# Patient Record
Sex: MALE | Race: WHITE | NOT HISPANIC OR LATINO | Employment: FULL TIME | URBAN - METROPOLITAN AREA
[De-identification: names, ages, dates, MRNs, and addresses within clinical notes are randomized per-mention and may not be internally consistent; named-entity substitution may affect disease eponyms.]

---

## 2021-03-18 ENCOUNTER — OFFICE VISIT (OUTPATIENT)
Dept: FAMILY MEDICINE CLINIC | Facility: CLINIC | Age: 26
End: 2021-03-18

## 2021-03-18 VITALS
TEMPERATURE: 98.4 F | BODY MASS INDEX: 22.96 KG/M2 | RESPIRATION RATE: 18 BRPM | HEART RATE: 82 BPM | OXYGEN SATURATION: 99 % | SYSTOLIC BLOOD PRESSURE: 135 MMHG | WEIGHT: 164 LBS | HEIGHT: 71 IN | DIASTOLIC BLOOD PRESSURE: 80 MMHG

## 2021-03-18 DIAGNOSIS — F41.9 ANXIETY AND DEPRESSION: ICD-10-CM

## 2021-03-18 DIAGNOSIS — S29.9XXA RIB INJURY: Primary | ICD-10-CM

## 2021-03-18 DIAGNOSIS — F32.A ANXIETY AND DEPRESSION: ICD-10-CM

## 2021-03-18 PROCEDURE — 99204 OFFICE O/P NEW MOD 45 MIN: CPT | Performed by: FAMILY MEDICINE

## 2021-03-18 RX ORDER — NAPROXEN 500 MG/1
500 TABLET ORAL 2 TIMES DAILY WITH MEALS
Qty: 60 TABLET | Refills: 0 | Status: SHIPPED | OUTPATIENT
Start: 2021-03-18 | End: 2022-05-04
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
## 2022-05-04 ENCOUNTER — OFFICE VISIT (OUTPATIENT)
Dept: FAMILY MEDICINE CLINIC | Facility: CLINIC | Age: 27
End: 2022-05-04

## 2022-05-04 ENCOUNTER — LAB (OUTPATIENT)
Dept: LAB | Facility: HOSPITAL | Age: 27
End: 2022-05-04

## 2022-05-04 VITALS
WEIGHT: 179 LBS | HEART RATE: 79 BPM | DIASTOLIC BLOOD PRESSURE: 72 MMHG | HEIGHT: 71 IN | BODY MASS INDEX: 25.06 KG/M2 | OXYGEN SATURATION: 98 % | SYSTOLIC BLOOD PRESSURE: 120 MMHG | TEMPERATURE: 96 F

## 2022-05-04 DIAGNOSIS — Z00.00 ANNUAL PHYSICAL EXAM: ICD-10-CM

## 2022-05-04 DIAGNOSIS — M25.562 ACUTE PAIN OF LEFT KNEE: ICD-10-CM

## 2022-05-04 DIAGNOSIS — Z00.00 ANNUAL PHYSICAL EXAM: Primary | ICD-10-CM

## 2022-05-04 DIAGNOSIS — I10 PRIMARY HYPERTENSION: ICD-10-CM

## 2022-05-04 DIAGNOSIS — L98.9 SKIN LESION: ICD-10-CM

## 2022-05-04 DIAGNOSIS — Z11.3 SCREEN FOR STD (SEXUALLY TRANSMITTED DISEASE): ICD-10-CM

## 2022-05-04 DIAGNOSIS — E61.1 IRON DEFICIENCY: ICD-10-CM

## 2022-05-04 LAB
ALBUMIN SERPL-MCNC: 4.7 G/DL (ref 3.5–5.2)
ALBUMIN/GLOB SERPL: 2.1 G/DL
ALP SERPL-CCNC: 87 U/L (ref 39–117)
ALT SERPL W P-5'-P-CCNC: 16 U/L (ref 1–41)
ANION GAP SERPL CALCULATED.3IONS-SCNC: 11.4 MMOL/L (ref 5–15)
AST SERPL-CCNC: 18 U/L (ref 1–40)
BASOPHILS # BLD AUTO: 0.04 10*3/MM3 (ref 0–0.2)
BASOPHILS NFR BLD AUTO: 0.7 % (ref 0–1.5)
BILIRUB SERPL-MCNC: 0.3 MG/DL (ref 0–1.2)
BUN SERPL-MCNC: 12 MG/DL (ref 6–20)
BUN/CREAT SERPL: 9.8 (ref 7–25)
CALCIUM SPEC-SCNC: 9.3 MG/DL (ref 8.6–10.5)
CHLORIDE SERPL-SCNC: 104 MMOL/L (ref 98–107)
CHOLEST SERPL-MCNC: 105 MG/DL (ref 0–200)
CO2 SERPL-SCNC: 23.6 MMOL/L (ref 22–29)
CREAT SERPL-MCNC: 1.23 MG/DL (ref 0.76–1.27)
DEPRECATED RDW RBC AUTO: 41.1 FL (ref 37–54)
EGFRCR SERPLBLD CKD-EPI 2021: 82 ML/MIN/1.73
EOSINOPHIL # BLD AUTO: 0.03 10*3/MM3 (ref 0–0.4)
EOSINOPHIL NFR BLD AUTO: 0.5 % (ref 0.3–6.2)
ERYTHROCYTE [DISTWIDTH] IN BLOOD BY AUTOMATED COUNT: 11.8 % (ref 12.3–15.4)
FERRITIN SERPL-MCNC: 78 NG/ML (ref 30–400)
FOLATE SERPL-MCNC: 11.3 NG/ML (ref 4.78–24.2)
GLOBULIN UR ELPH-MCNC: 2.2 GM/DL
GLUCOSE SERPL-MCNC: 92 MG/DL (ref 65–99)
HCT VFR BLD AUTO: 45.7 % (ref 37.5–51)
HCV AB SER DONR QL: NORMAL
HDLC SERPL-MCNC: 43 MG/DL (ref 40–60)
HGB BLD-MCNC: 15.7 G/DL (ref 13–17.7)
HIV1+2 AB SER QL: NORMAL
IMM GRANULOCYTES # BLD AUTO: 0.02 10*3/MM3 (ref 0–0.05)
IMM GRANULOCYTES NFR BLD AUTO: 0.4 % (ref 0–0.5)
IRON 24H UR-MRATE: 66 MCG/DL (ref 59–158)
IRON SATN MFR SERPL: 19 % (ref 20–50)
LDLC SERPL CALC-MCNC: 53 MG/DL (ref 0–100)
LDLC/HDLC SERPL: 1.3 {RATIO}
LYMPHOCYTES # BLD AUTO: 1.84 10*3/MM3 (ref 0.7–3.1)
LYMPHOCYTES NFR BLD AUTO: 33.2 % (ref 19.6–45.3)
MCH RBC QN AUTO: 32.4 PG (ref 26.6–33)
MCHC RBC AUTO-ENTMCNC: 34.4 G/DL (ref 31.5–35.7)
MCV RBC AUTO: 94.2 FL (ref 79–97)
MONOCYTES # BLD AUTO: 0.44 10*3/MM3 (ref 0.1–0.9)
MONOCYTES NFR BLD AUTO: 7.9 % (ref 5–12)
NEUTROPHILS NFR BLD AUTO: 3.18 10*3/MM3 (ref 1.7–7)
NEUTROPHILS NFR BLD AUTO: 57.3 % (ref 42.7–76)
NRBC BLD AUTO-RTO: 0 /100 WBC (ref 0–0.2)
PLATELET # BLD AUTO: 286 10*3/MM3 (ref 140–450)
PMV BLD AUTO: 10.1 FL (ref 6–12)
POTASSIUM SERPL-SCNC: 4.2 MMOL/L (ref 3.5–5.2)
PROT SERPL-MCNC: 6.9 G/DL (ref 6–8.5)
RBC # BLD AUTO: 4.85 10*6/MM3 (ref 4.14–5.8)
RPR SER QL: NORMAL
SODIUM SERPL-SCNC: 139 MMOL/L (ref 136–145)
TIBC SERPL-MCNC: 347 MCG/DL (ref 298–536)
TRANSFERRIN SERPL-MCNC: 233 MG/DL (ref 200–360)
TRIGL SERPL-MCNC: 30 MG/DL (ref 0–150)
TSH SERPL DL<=0.05 MIU/L-ACNC: 2.11 UIU/ML (ref 0.27–4.2)
VIT B12 BLD-MCNC: 485 PG/ML (ref 211–946)
VLDLC SERPL-MCNC: 9 MG/DL (ref 5–40)
WBC NRBC COR # BLD: 5.55 10*3/MM3 (ref 3.4–10.8)

## 2022-05-04 PROCEDURE — 85025 COMPLETE CBC W/AUTO DIFF WBC: CPT

## 2022-05-04 PROCEDURE — 86803 HEPATITIS C AB TEST: CPT

## 2022-05-04 PROCEDURE — 84466 ASSAY OF TRANSFERRIN: CPT

## 2022-05-04 PROCEDURE — 36415 COLL VENOUS BLD VENIPUNCTURE: CPT

## 2022-05-04 PROCEDURE — 17110 DESTRUCTION B9 LES UP TO 14: CPT | Performed by: FAMILY MEDICINE

## 2022-05-04 PROCEDURE — 83540 ASSAY OF IRON: CPT

## 2022-05-04 PROCEDURE — 99395 PREV VISIT EST AGE 18-39: CPT | Performed by: FAMILY MEDICINE

## 2022-05-04 PROCEDURE — 82607 VITAMIN B-12: CPT

## 2022-05-04 PROCEDURE — 80061 LIPID PANEL: CPT

## 2022-05-04 PROCEDURE — 82728 ASSAY OF FERRITIN: CPT

## 2022-05-04 PROCEDURE — 82746 ASSAY OF FOLIC ACID SERUM: CPT

## 2022-05-04 PROCEDURE — G0432 EIA HIV-1/HIV-2 SCREEN: HCPCS

## 2022-05-04 PROCEDURE — 99213 OFFICE O/P EST LOW 20 MIN: CPT | Performed by: FAMILY MEDICINE

## 2022-05-04 PROCEDURE — 84443 ASSAY THYROID STIM HORMONE: CPT

## 2022-05-04 PROCEDURE — 86592 SYPHILIS TEST NON-TREP QUAL: CPT

## 2022-05-04 PROCEDURE — 80053 COMPREHEN METABOLIC PANEL: CPT

## 2022-05-04 NOTE — PROGRESS NOTES
Follow Up Office Visit      Patient Name: Matthew Castillo  : 1995   MRN: [id number]     Chief Complaint:    Chief Complaint   Patient presents with   • Hypertension   • iron problem    • Annual Exam       History of Present Illness: Matthew Castillo is a 26 y.o. male who is here today to follow up with a few issues including elevated blood pressure at home, skin lesion on his penis and he reports low iron in his blood.  He gives plasma and has had this checked before.      Low iron - taking 65mg iron supplement. Gives plasma.     Skin lesion -reports a pimple-like lesion on his penis.  Denies any extra or sexual activity except for with his wife.  No other symptoms.    bp - checks 4-5x a week. It runs 144/88 at home. Blood pressure cuff is newer. He has hx of high bp when he took it on blood donation bus.     Annual -here for annual exam so we discussed dental care, diet, exercise vaccines.  He is up-to-date with all vaccines.    Of note patient has family history of aneurysm.      Review of systems positive for left knee pain.      Physical exam: Patient neurologic grossly intact.  Patient mood and affect is appropriate.  Patient no acute distress.  Patient's lung and heart exam was normal without rales rhonchi's or murmurs.  Patient's joints had no swelling.  No lower extremity edema.  On inferior aspect of shaft of penis is a submillimeter papular lesion with pustular component.  No other lesions noted.      Subjective        I have reviewed and the following portions of the patient's history were updated as appropriate: past family history, past medical history, past social history, past surgical history and problem list.    Medications:   No current outpatient medications on file.    Allergies:   No Known Allergies    Objective     Physical Exam: Please see above  Vital Signs:   Vitals:    22 0809   BP: 120/72   Pulse: 79   Temp: 96 °F (35.6 °C)   SpO2: 98%   Weight: 81.2 kg (179 lb)   Height:  "180.3 cm (71\")   PainSc: 0-No pain     Body mass index is 24.97 kg/m².          Assessment / Plan      Assessment/Plan:   Diagnoses and all orders for this visit:    1. Annual physical exam (Primary)  -     CBC & Differential; Future  -     Comprehensive Metabolic Panel; Future  -     Lipid Panel; Future  -     TSH Rfx On Abnormal To Free T4; Future  -     Hepatitis C Antibody; Future  -     HIV-1 / O / 2 Ag / Antibody 4th Generation; Future    2. Iron deficiency  -     Vitamin B12; Future  -     Folate; Future  -     Ferritin; Future  -     Iron and TIBC; Future    3. Skin lesion    4. Primary hypertension    5. Acute pain of left knee    6. Screen for STD (sexually transmitted disease)  -     RPR; Future  -     OneSwab - Kit, Urine, Clean Catch; Future     Annual counseling: Discussed dental care, diet, exercise.  Discussed vaccines and is up-to-date.  Reviewed STD testing.    Patient presented with concern for iron deficiency as reported on his previous lab work at the [facility].  Will confirm with testing today and work it up as needed.    Patient has skin lesion on his penis that looks like either a cyst or acne.      Procedure note- cryotherapy  Sprayed lesion using cryo therapy, and patient tolerated this procedure well.  Had no acute complications.  This was not for cosmetic use.  Treated 1 lesion.    Patient reports blood pressure issues at home.  Today it is well controlled.  Recommend resting for longer period of time and rechecking blood pressure.  Follow-up in 6 months if no better.    Acute pain of left knee.  Patient is currently doing physical therapy himself.  Will call if he needs a prescription for physical therapy in the future.    Follow Up:   Return if symptoms worsen or fail to improve.    Siddharth Pond,   [facility] [place] [facility] [place]   "

## 2022-05-20 ENCOUNTER — TELEPHONE (OUTPATIENT)
Dept: FAMILY MEDICINE CLINIC | Facility: CLINIC | Age: 27
End: 2022-05-20

## 2022-05-20 NOTE — TELEPHONE ENCOUNTER
Caller: Matthew Castillo    Relationship: Self    Best call back number: [phone]     What is the best time to reach you:ANYTIME    Who are you PCP OR MA    Do you know the name of the person who called:    What was the call regarding: PATIENT IS REQUESTING A CALLBACK TO DISCUSS HIS LABS SPECIFICALLY HIS CREATINE AND IRON LEVELS    Do you require a callback: YES

## 2022-05-23 DIAGNOSIS — R94.4 DECREASED GFR: Primary | ICD-10-CM

## 2022-07-25 ENCOUNTER — LAB (OUTPATIENT)
Dept: LAB | Facility: HOSPITAL | Age: 27
End: 2022-07-25

## 2022-07-25 DIAGNOSIS — R94.4 DECREASED GFR: ICD-10-CM

## 2022-07-25 LAB
ANION GAP SERPL CALCULATED.3IONS-SCNC: 12.8 MMOL/L (ref 5–15)
BUN SERPL-MCNC: 14 MG/DL (ref 6–20)
BUN/CREAT SERPL: 13.2 (ref 7–25)
CALCIUM SPEC-SCNC: 9.9 MG/DL (ref 8.6–10.5)
CHLORIDE SERPL-SCNC: 100 MMOL/L (ref 98–107)
CO2 SERPL-SCNC: 25.2 MMOL/L (ref 22–29)
CREAT SERPL-MCNC: 1.06 MG/DL (ref 0.76–1.27)
EGFRCR SERPLBLD CKD-EPI 2021: 98 ML/MIN/1.73
GLUCOSE SERPL-MCNC: 75 MG/DL (ref 65–99)
POTASSIUM SERPL-SCNC: 4.2 MMOL/L (ref 3.5–5.2)
SODIUM SERPL-SCNC: 138 MMOL/L (ref 136–145)

## 2022-07-25 PROCEDURE — 80048 BASIC METABOLIC PNL TOTAL CA: CPT

## 2023-09-15 ENCOUNTER — OFFICE VISIT (OUTPATIENT)
Dept: FAMILY MEDICINE CLINIC | Facility: CLINIC | Age: 28
End: 2023-09-15
Payer: COMMERCIAL

## 2023-09-15 ENCOUNTER — LAB (OUTPATIENT)
Dept: LAB | Facility: HOSPITAL | Age: 28
End: 2023-09-15
Payer: COMMERCIAL

## 2023-09-15 VITALS
BODY MASS INDEX: 22.68 KG/M2 | TEMPERATURE: 99 F | WEIGHT: 162 LBS | DIASTOLIC BLOOD PRESSURE: 70 MMHG | HEIGHT: 71 IN | OXYGEN SATURATION: 100 % | SYSTOLIC BLOOD PRESSURE: 116 MMHG | HEART RATE: 76 BPM

## 2023-09-15 DIAGNOSIS — Z00.00 ANNUAL PHYSICAL EXAM: ICD-10-CM

## 2023-09-15 DIAGNOSIS — Z00.00 ANNUAL PHYSICAL EXAM: Primary | ICD-10-CM

## 2023-09-15 LAB
ALBUMIN SERPL-MCNC: 5.1 G/DL (ref 3.5–5.2)
ALBUMIN/GLOB SERPL: 2.4 G/DL
ALP SERPL-CCNC: 71 U/L (ref 39–117)
ALT SERPL W P-5'-P-CCNC: 19 U/L (ref 1–41)
ANION GAP SERPL CALCULATED.3IONS-SCNC: 10 MMOL/L (ref 5–15)
AST SERPL-CCNC: 21 U/L (ref 1–40)
BASOPHILS # BLD AUTO: 0.04 10*3/MM3 (ref 0–0.2)
BASOPHILS NFR BLD AUTO: 0.7 % (ref 0–1.5)
BILIRUB SERPL-MCNC: 0.4 MG/DL (ref 0–1.2)
BUN SERPL-MCNC: 11 MG/DL (ref 6–20)
BUN/CREAT SERPL: 10.6 (ref 7–25)
CALCIUM SPEC-SCNC: 9.9 MG/DL (ref 8.6–10.5)
CHLORIDE SERPL-SCNC: 103 MMOL/L (ref 98–107)
CO2 SERPL-SCNC: 26 MMOL/L (ref 22–29)
CREAT SERPL-MCNC: 1.04 MG/DL (ref 0.76–1.27)
DEPRECATED RDW RBC AUTO: 37.4 FL (ref 37–54)
EGFRCR SERPLBLD CKD-EPI 2021: 99.1 ML/MIN/1.73
EOSINOPHIL # BLD AUTO: 0.03 10*3/MM3 (ref 0–0.4)
EOSINOPHIL NFR BLD AUTO: 0.5 % (ref 0.3–6.2)
ERYTHROCYTE [DISTWIDTH] IN BLOOD BY AUTOMATED COUNT: 11.8 % (ref 12.3–15.4)
GLOBULIN UR ELPH-MCNC: 2.1 GM/DL
GLUCOSE SERPL-MCNC: 89 MG/DL (ref 65–99)
HCT VFR BLD AUTO: 41.3 % (ref 37.5–51)
HGB BLD-MCNC: 14.7 G/DL (ref 13–17.7)
IMM GRANULOCYTES # BLD AUTO: 0.02 10*3/MM3 (ref 0–0.05)
IMM GRANULOCYTES NFR BLD AUTO: 0.3 % (ref 0–0.5)
LYMPHOCYTES # BLD AUTO: 2 10*3/MM3 (ref 0.7–3.1)
LYMPHOCYTES NFR BLD AUTO: 34.6 % (ref 19.6–45.3)
MCH RBC QN AUTO: 31.5 PG (ref 26.6–33)
MCHC RBC AUTO-ENTMCNC: 35.6 G/DL (ref 31.5–35.7)
MCV RBC AUTO: 88.6 FL (ref 79–97)
MONOCYTES # BLD AUTO: 0.49 10*3/MM3 (ref 0.1–0.9)
MONOCYTES NFR BLD AUTO: 8.5 % (ref 5–12)
NEUTROPHILS NFR BLD AUTO: 3.2 10*3/MM3 (ref 1.7–7)
NEUTROPHILS NFR BLD AUTO: 55.4 % (ref 42.7–76)
NRBC BLD AUTO-RTO: 0 /100 WBC (ref 0–0.2)
PLATELET # BLD AUTO: 262 10*3/MM3 (ref 140–450)
PMV BLD AUTO: 10.3 FL (ref 6–12)
POTASSIUM SERPL-SCNC: 4.2 MMOL/L (ref 3.5–5.2)
PROT SERPL-MCNC: 7.2 G/DL (ref 6–8.5)
RBC # BLD AUTO: 4.66 10*6/MM3 (ref 4.14–5.8)
SODIUM SERPL-SCNC: 139 MMOL/L (ref 136–145)
WBC NRBC COR # BLD: 5.78 10*3/MM3 (ref 3.4–10.8)

## 2023-09-15 PROCEDURE — 85025 COMPLETE CBC W/AUTO DIFF WBC: CPT

## 2023-09-15 PROCEDURE — 99395 PREV VISIT EST AGE 18-39: CPT | Performed by: FAMILY MEDICINE

## 2023-09-15 PROCEDURE — 80053 COMPREHEN METABOLIC PANEL: CPT

## 2023-09-15 NOTE — PROGRESS NOTES
"     Follow Up Office Visit      Patient Name: Matthew Castillo  : 1995   MRN: [id number]     Chief Complaint:    Chief Complaint   Patient presents with    Annual Exam     Creatinine levels?       History of Present Illness: Matthew Castillo is a 28 y.o. male who is here today to follow up with history of elevated creatinine levels in the past.  Otherwise his blood work has looked really good.  He takes iron supplementation.  He cycles a lot and does high-level exercise training.  He is cut out resistance training.  He feels like his diet is pretty good.  Patient up-to-date with dental and vision checks.  Up to date with vaccines      Physical exam: Patient's mood and affect is appropriate.  Neurological exam was grossly intact.  PERRLA.  Neck supple.  Heart exam RRR.  Lung exam CTA bilateral.  No lower extremity edema.      Subjective        I have reviewed and the following portions of the patient's history were updated as appropriate: past family history, past medical history, past social history, past surgical history and problem list.    Medications:   No current outpatient medications on file.    Allergies:   No Known Allergies    Objective     Physical Exam: Please see above  Vital Signs:   Vitals:    09/15/23 1504   BP: 116/70   Pulse: 76   Temp: 99 °F (37.2 °C)   SpO2: 100%   Weight: 73.5 kg (162 lb)   Height: 180.3 cm (71\")     Body mass index is 22.59 kg/m².          Assessment / Plan      Assessment/Plan:   Diagnoses and all orders for this visit:    1. Annual physical exam (Primary)  -     Comprehensive Metabolic Panel; Future  -     CBC & Differential; Future    We will continue to monitor CBC since patient is on ferritin.  Will check CMP as well.  Overall patient is doing well.  Follow-up yearly    Annual exam counseling: Counseled patient regards to diet and exercise.  Recommended 150 minutes of moderate exercise weekly.  Recommend incorporating resistance training into his routine.  Stiff day " with dental and vision checks.  Patient up-to-date with vaccines.    Follow Up:   Return in about 1 year (around 9/15/2024) for Annual.    Siddharth Pond DO  [facility] [place] [facility] [place]

## 2023-11-27 ENCOUNTER — OFFICE VISIT (OUTPATIENT)
Dept: FAMILY MEDICINE CLINIC | Facility: CLINIC | Age: 28
End: 2023-11-27
Payer: COMMERCIAL

## 2023-11-27 VITALS
HEIGHT: 71 IN | OXYGEN SATURATION: 99 % | SYSTOLIC BLOOD PRESSURE: 123 MMHG | HEART RATE: 86 BPM | BODY MASS INDEX: 23.3 KG/M2 | WEIGHT: 166.4 LBS | DIASTOLIC BLOOD PRESSURE: 72 MMHG | TEMPERATURE: 98 F

## 2023-11-27 DIAGNOSIS — K29.50 CHRONIC GASTRITIS WITHOUT BLEEDING, UNSPECIFIED GASTRITIS TYPE: Primary | ICD-10-CM

## 2023-11-27 DIAGNOSIS — K58.9 IRRITABLE BOWEL SYNDROME WITHOUT DIARRHEA: ICD-10-CM

## 2023-11-27 PROCEDURE — 99214 OFFICE O/P EST MOD 30 MIN: CPT | Performed by: NURSE PRACTITIONER

## 2023-11-27 RX ORDER — SUCRALFATE 1 G/1
1 TABLET ORAL 4 TIMES DAILY
Qty: 120 TABLET | Refills: 0 | Status: SHIPPED | OUTPATIENT
Start: 2023-11-27

## 2023-11-27 RX ORDER — FAMOTIDINE 20 MG/1
20 TABLET, FILM COATED ORAL 2 TIMES DAILY
COMMUNITY

## 2023-11-27 RX ORDER — DICYCLOMINE HYDROCHLORIDE 10 MG/1
10 CAPSULE ORAL
Qty: 120 CAPSULE | Refills: 1 | Status: SHIPPED | OUTPATIENT
Start: 2023-11-27

## 2023-11-27 RX ORDER — PANTOPRAZOLE SODIUM 40 MG/1
40 TABLET, DELAYED RELEASE ORAL 2 TIMES DAILY
Qty: 60 TABLET | Refills: 1 | Status: SHIPPED | OUTPATIENT
Start: 2023-11-27

## 2023-11-28 PROBLEM — K58.9 IRRITABLE BOWEL SYNDROME WITHOUT DIARRHEA: Status: ACTIVE | Noted: 2023-11-28

## 2023-11-28 PROBLEM — K29.50 CHRONIC GASTRITIS WITHOUT BLEEDING: Chronic | Status: ACTIVE | Noted: 2023-11-28

## 2023-11-28 PROBLEM — K29.50 CHRONIC GASTRITIS WITHOUT BLEEDING: Status: ACTIVE | Noted: 2023-11-28

## 2023-11-28 PROBLEM — K58.9 IRRITABLE BOWEL SYNDROME WITHOUT DIARRHEA: Chronic | Status: ACTIVE | Noted: 2023-11-28

## 2023-11-28 NOTE — PROGRESS NOTES
Follow Up Office Note     Patient Name: Matthew Castillo  : 1995   MRN: [id number]     Chief Complaint:    Chief Complaint   Patient presents with    Irritable Bowel Syndrome     Per patient when he eats he has a pain that goes from one side to the other, under rib area. Did't last long per patient. Patient also has esophagitis.        History of Present Illness: Matthew Castillo is a 28 y.o. male who presents today with c/o dysphagia, epigastric pain and abdominal pain which is worse after eating. Patient had EGD and Colonoscopy in 2017 and was diagnosed with esophagitis, gastritis and IBS. He states that his symptoms have flared over the past week. He states that he has been eating a bland diet, but he is still having pain. He reports that he has also tried OTC famotidine with only minimal benefit.      Subjective      I have reviewed and the following portions of the patient's history were updated as appropriate: past family history, past medical history, past social history, past surgical history and problem list.    Review of Systems:   Review of Systems   Constitutional:  Positive for appetite change. Negative for chills, diaphoresis, fever and unexpected weight change.   Respiratory: Negative.     Cardiovascular:  Negative for chest pain and palpitations.   Gastrointestinal:  Positive for abdominal pain. Negative for blood in stool, constipation, diarrhea, nausea and vomiting.   Genitourinary:  Negative for dysuria and flank pain.   Musculoskeletal:  Negative for myalgias.        Past Medical History:   Past Medical History:   Diagnosis Date    Esophageal ulcer 2017    Hypertension     Irritable bowel syndrome          Medications:     Current Outpatient Medications:     famotidine (PEPCID) 20 MG tablet, Take 1 tablet by mouth 2 (Two) Times a Day., Disp: , Rfl:     dicyclomine (BENTYL) 10 MG capsule, Take 1 capsule by mouth 4 (Four) Times a Day Before Meals & at Bedtime., Disp: 120 capsule, Rfl:  "1    pantoprazole (PROTONIX) 40 MG EC tablet, Take 1 tablet by mouth 2 (Two) Times a Day., Disp: 60 tablet, Rfl: 1    sucralfate (Carafate) 1 g tablet, Take 1 tablet by mouth 4 (Four) Times a Day., Disp: 120 tablet, Rfl: 0    Allergies:   No Known Allergies      Objective     Physical Exam:  Vital Signs:   Vitals:    11/27/23 1107   BP: 123/72   Pulse: 86   Temp: 98 °F (36.7 °C)   TempSrc: Infrared   SpO2: 99%   Weight: 75.5 kg (166 lb 6.4 oz)   Height: 180.3 cm (70.98\")   PainSc: 0-No pain     Body mass index is 23.22 kg/m².     Physical Exam  Vitals and nursing note reviewed.   Constitutional:       General: He is not in acute distress.     Appearance: Normal appearance. He is well-developed. He is not ill-appearing, toxic-appearing or diaphoretic.   HENT:      Head: Normocephalic and atraumatic.   Cardiovascular:      Rate and Rhythm: Normal rate and regular rhythm.   Pulmonary:      Effort: Pulmonary effort is normal. No respiratory distress.      Breath sounds: Normal breath sounds. No stridor. No wheezing.   Abdominal:      General: Bowel sounds are normal. There is no distension.      Palpations: Abdomen is soft.      Tenderness: There is abdominal tenderness (mild) in the right upper quadrant and epigastric area.   Skin:     General: Skin is warm and dry.   Neurological:      General: No focal deficit present.      Mental Status: He is alert and oriented to person, place, and time.   Psychiatric:         Mood and Affect: Mood normal.         Behavior: Behavior normal. Behavior is cooperative.         Thought Content: Thought content normal.         Judgment: Judgment normal.         Assessment / Plan      Assessment/Plan:   Diagnoses and all orders for this visit:    1. Chronic gastritis without bleeding, unspecified gastritis type (Primary)  Assessment & Plan:  Acute exacerbation of chronic problem.  Medication therapy per orders.  Follow-up in 2 to 4 weeks.  Should patient's symptoms persist recommend " considering gallbladder studies and/or gastro referral for repeat EGD and colonoscopy.  Commend bland diet and gallbladder eating plan.    Orders:  -     pantoprazole (PROTONIX) 40 MG EC tablet; Take 1 tablet by mouth 2 (Two) Times a Day.  Dispense: 60 tablet; Refill: 1  -     sucralfate (Carafate) 1 g tablet; Take 1 tablet by mouth 4 (Four) Times a Day.  Dispense: 120 tablet; Refill: 0    2. Irritable bowel syndrome without diarrhea  Assessment & Plan:  Acute exacerbation of chronic problem.  Plan to begin trial of Bentyl.    Orders:  -     dicyclomine (BENTYL) 10 MG capsule; Take 1 capsule by mouth 4 (Four) Times a Day Before Meals & at Bedtime.  Dispense: 120 capsule; Refill: 1           Follow Up:   PRN and at next scheduled appointment(s) with PCP.    Discussed the nature of the medical condition(s) risks, complications, implications, management, safe and proper use of medications. Encouraged medication compliance, and keeping scheduled follow up appointments with me and any other providers.      RTC if symptoms fail to improve, to ER if symptoms worsen.        *Dictated Utilizing Dragon Dictation   Please note that portions of this note were completed with a voice recognition program.   Part of this note may be an electronic transcription/translation of spoken language to printed text using the Dragon Dictation System. Spelling and/or grammatical errors may exist despite efforts at proofreading.      NOTE TO PATIENT: The 21st Century Cures Act makes medical notes like these available to patients in the interest of transparency. However, be advised this is a medical document. It is intended as peer to peer communication. It is written in medical language and may contain abbreviations or verbiage that are unfamiliar. It may appear blunt or direct. Medical documents are intended to carry relevant information, facts as evident, and the clinical opinion of the practitioner.      NAT Schaefer  [facility] [place] [place] [facility] [place]

## 2023-11-28 NOTE — ASSESSMENT & PLAN NOTE
Acute exacerbation of chronic problem.  Medication therapy per orders.  Follow-up in 2 to 4 weeks.  Should patient's symptoms persist recommend considering gallbladder studies and/or gastro referral for repeat EGD and colonoscopy.  Commend bland diet and gallbladder eating plan.

## 2023-12-06 ENCOUNTER — OFFICE VISIT (OUTPATIENT)
Dept: FAMILY MEDICINE CLINIC | Facility: CLINIC | Age: 28
End: 2023-12-06
Payer: COMMERCIAL

## 2023-12-06 VITALS
DIASTOLIC BLOOD PRESSURE: 68 MMHG | OXYGEN SATURATION: 99 % | TEMPERATURE: 98.6 F | SYSTOLIC BLOOD PRESSURE: 124 MMHG | WEIGHT: 167 LBS | HEIGHT: 71 IN | HEART RATE: 88 BPM | BODY MASS INDEX: 23.38 KG/M2

## 2023-12-06 DIAGNOSIS — F41.9 ANXIETY: ICD-10-CM

## 2023-12-06 DIAGNOSIS — R10.13 EPIGASTRIC PAIN: Primary | ICD-10-CM

## 2023-12-06 PROCEDURE — 99214 OFFICE O/P EST MOD 30 MIN: CPT | Performed by: FAMILY MEDICINE

## 2023-12-06 RX ORDER — AMITRIPTYLINE HYDROCHLORIDE 10 MG/1
10 TABLET, FILM COATED ORAL NIGHTLY
Qty: 30 TABLET | Refills: 2 | Status: SHIPPED | OUTPATIENT
Start: 2023-12-06

## 2023-12-06 NOTE — PROGRESS NOTES
Follow Up Office Visit      Patient Name: Matthew Castillo  : 1995   MRN: [id number]     Chief Complaint:    Chief Complaint   Patient presents with    Anxiety    Depression    Irritable Bowel Syndrome     Has lasted for a month, noticed flare up 2023.       History of Present Illness: Matthew Castillo is a 28 y.o. male who is here today to follow up with upper abdominal pain.       Stomach pain - started a month ago. Hx of ibs. Has stomach pain and gerd. Wasn't on an antiacids when it started. Since it started he has been on pepcid, prilosec, and now pantoprazole. He has had egd. He has been diagnosed with esophagitis and ibs. In 2017 he had an ulcer. Now after he eats the symptoms persist for up to two hours. He has a pain diary. When he wakes up pain is better, no pain. He gets lower chest pain. Pain can be under left shoulder and rib cage. He isn't constipated and says he goes every day or every other day. No diarrhea. Pain can radiate to back recently. Pain has improved. Denies alcohol use. Has burning sensation b/l. No black stool. His sx improved 90%.     Anxiety - increasing due to holidays and work.       Physical exam: mild tenderness RUQ. Non distended abdomen.       Subjective        I have reviewed and the following portions of the patient's history were updated as appropriate: past family history, past medical history, past social history, past surgical history and problem list.    Medications:     Current Outpatient Medications:     dicyclomine (BENTYL) 10 MG capsule, Take 1 capsule by mouth 4 (Four) Times a Day Before Meals & at Bedtime., Disp: 120 capsule, Rfl: 1    pantoprazole (PROTONIX) 40 MG EC tablet, Take 1 tablet by mouth 2 (Two) Times a Day., Disp: 60 tablet, Rfl: 1    sucralfate (Carafate) 1 g tablet, Take 1 tablet by mouth 4 (Four) Times a Day., Disp: 120 tablet, Rfl: 0    amitriptyline (ELAVIL) 10 MG tablet, Take 1 tablet by mouth Every Night., Disp: 30 tablet, Rfl:  "2    Allergies:   No Known Allergies    Objective     Physical Exam: Please see above  Vital Signs:   Vitals:    12/06/23 1006   BP: 124/68   Pulse: 88   Temp: 98.6 °F (37 °C)   SpO2: 99%   Weight: 75.8 kg (167 lb)   Height: 180.3 cm (70.98\")   PainSc:   1     Body mass index is 23.3 kg/m².          Assessment / Plan      Assessment/Plan:   Diagnoses and all orders for this visit:    1. Epigastric pain (Primary)    2. Anxiety  -     amitriptyline (ELAVIL) 10 MG tablet; Take 1 tablet by mouth Every Night.  Dispense: 30 tablet; Refill: 2           Abdominal pain -gallbladder ulcer related.  Will hold off on US. Can call for US if pain persist or worsens.     Anxiety and IBS - increased stress and IBS sx. are Elavil.  Can stop if any side effects occur.  Consider Effexor next visit if not doing well on current medication.  If no side effects with Elavil but no improvement-can increase slowly.    If patient calls for ultrasound, ordered for epigastric pain in relation to possible gallbladder disorder.  May need HIDA scan in the future.  We will wean patient off of pantoprazole when he calls in for refill.  Patient will reduce dose to once a day 40 mg dosing of pantoprazole and then after 1 month of that he will go down to 20 mg and then stop.  Hopefully will wean off of all medication over the next few months.  Will refer to GI if endoscopy is needed.  Etiology likely ulcer or gallbladder related.    Follow Up:   Return in about 2 months (around 2/6/2024) for Recheck.    Siddharth Pond DO  [facility] [place] [facility] [place]   "

## 2023-12-13 DIAGNOSIS — R10.13 EPIGASTRIC PAIN: Primary | ICD-10-CM

## 2023-12-15 ENCOUNTER — LAB (OUTPATIENT)
Dept: LAB | Facility: HOSPITAL | Age: 28
End: 2023-12-15
Payer: COMMERCIAL

## 2023-12-15 DIAGNOSIS — R10.13 EPIGASTRIC PAIN: ICD-10-CM

## 2023-12-15 PROCEDURE — 87338 HPYLORI STOOL AG IA: CPT

## 2023-12-16 LAB — H PYLORI AG STL QL IA: NEGATIVE

## 2023-12-19 ENCOUNTER — TELEPHONE (OUTPATIENT)
Dept: FAMILY MEDICINE CLINIC | Facility: CLINIC | Age: 28
End: 2023-12-19

## 2023-12-19 NOTE — TELEPHONE ENCOUNTER
Caller: Matthew Castillo    Relationship: Self    Best call back number: [phone]    What is the medical concern/diagnosis: GI ISSUES    Any additional details: PATIENT HAS BEEN IN A FEW TIMES WITHIN THE LAST 2 MONTHS WITH GI ISSUES AND WOULD LIKE TO SEE A GI SPECIALIST. PLEASE CALL PATIENT WHEN THIS IS DONE

## 2023-12-21 DIAGNOSIS — K29.50 CHRONIC GASTRITIS WITHOUT BLEEDING, UNSPECIFIED GASTRITIS TYPE: ICD-10-CM

## 2023-12-21 RX ORDER — SUCRALFATE 1 G/1
1 TABLET ORAL 4 TIMES DAILY
Qty: 120 TABLET | Refills: 0 | Status: SHIPPED | OUTPATIENT
Start: 2023-12-21

## 2024-01-02 ENCOUNTER — OFFICE VISIT (OUTPATIENT)
Dept: FAMILY MEDICINE CLINIC | Facility: CLINIC | Age: 29
End: 2024-01-02
Payer: COMMERCIAL

## 2024-01-02 VITALS
TEMPERATURE: 99.5 F | OXYGEN SATURATION: 99 % | HEART RATE: 96 BPM | WEIGHT: 172 LBS | DIASTOLIC BLOOD PRESSURE: 64 MMHG | SYSTOLIC BLOOD PRESSURE: 104 MMHG | BODY MASS INDEX: 24.08 KG/M2 | HEIGHT: 71 IN

## 2024-01-02 DIAGNOSIS — K29.50 CHRONIC GASTRITIS WITHOUT BLEEDING, UNSPECIFIED GASTRITIS TYPE: Primary | ICD-10-CM

## 2024-01-02 DIAGNOSIS — K58.9 IRRITABLE BOWEL SYNDROME WITHOUT DIARRHEA: ICD-10-CM

## 2024-01-02 PROCEDURE — 99213 OFFICE O/P EST LOW 20 MIN: CPT | Performed by: FAMILY MEDICINE

## 2024-01-02 NOTE — PROGRESS NOTES
Follow Up Office Visit      Patient Name: Matthew Castillo  : 1995   MRN: [id number]     Chief Complaint:    Chief Complaint   Patient presents with    Irritable Bowel Syndrome       History of Present Illness: Matthew Castillo is a 28 y.o. male who is here today to follow up with sharp pain in his left upper quadrant. He has tried prilosec, protonix, pepcid, and gas ex. He has been on elavil and colace. He has been on carafate. Food can make the pain worse and it can help it at times. He has been dx with IBS and ulcer.  Patient's symptoms include periodic regurgitation, pain, and bloating.  Patient reports that there is no exacerbating or alleviating factors as far as food.  He has symptoms with certain types of foods and it sometimes is food do not bother him as much.  Patient specifically reports that he ate oatmeal today and does not feel bad at all.  Patient has been on Elavil which has helped some but did cause some side effects as far as constipation and trouble sleeping.  He is currently on Colace which helps.  He is on a high dose of Protonix twice a day.  Patient has stopped Bentyl and Carafate.  Patient has been taking aloe vera, probiotics, and tricia root.    Patient has had EGD in the past with an ulcer.  Has Villanueva's esophagus in his family.      Physical exam: Patient does not have any tenderness palpation of all 4 quadrants of abdomen.  Mood and affect appropriate.      Subjective        I have reviewed and the following portions of the patient's history were updated as appropriate: past family history, past medical history, past social history, past surgical history and problem list.    Medications:     Current Outpatient Medications:     amitriptyline (ELAVIL) 10 MG tablet, Take 1 tablet by mouth Every Night., Disp: 30 tablet, Rfl: 2    docusate sodium (COLACE) 50 MG capsule, Take 1 capsule by mouth Daily., Disp: , Rfl:     pantoprazole (PROTONIX) 40 MG EC tablet, Take 1 tablet by  "mouth 2 (Two) Times a Day., Disp: 60 tablet, Rfl: 1    Allergies:   No Known Allergies    Objective     Physical Exam: Please see above  Vital Signs:   Vitals:    01/02/24 1209   BP: 104/64   Pulse: 96   Temp: 99.5 °F (37.5 °C)   SpO2: 99%   Weight: 78 kg (172 lb)   Height: 180.3 cm (70.98\")     Body mass index is 24 kg/m².          Assessment / Plan      Assessment/Plan:   Diagnoses and all orders for this visit:    1. Chronic gastritis without bleeding, unspecified gastritis type (Primary)  -     Ambulatory Referral to Gastroenterology    2. Irritable bowel syndrome without diarrhea  -     Ambulatory Referral to Gastroenterology    Recommend a trial of digestive enzymes.  Patient can continue other herbs and supplements that he is already taking.  Continue Protonix and amitriptyline.  Decided not to increase amitriptyline due to side effect patient has already had.  Continue current dose.  Continue Colace.    Refer patient to GI for further evaluation of epigastric pain and IBS type of symptoms.  Patient advised to take the EGD results from 2017 to visit.  Call with any worsening symptoms including weight loss, black or red stool, or worsening pain.    Follow Up:   Return if symptoms worsen or fail to improve.    Siddharth Pond, DO  [facility]   "

## 2024-01-23 ENCOUNTER — OFFICE VISIT (OUTPATIENT)
Dept: GASTROENTEROLOGY | Facility: CLINIC | Age: 29
End: 2024-01-23
Payer: COMMERCIAL

## 2024-01-23 VITALS
BODY MASS INDEX: 24.05 KG/M2 | SYSTOLIC BLOOD PRESSURE: 135 MMHG | TEMPERATURE: 98.9 F | HEIGHT: 71 IN | WEIGHT: 171.8 LBS | HEART RATE: 109 BPM | DIASTOLIC BLOOD PRESSURE: 71 MMHG

## 2024-01-23 DIAGNOSIS — K29.50 CHRONIC GASTRITIS WITHOUT BLEEDING, UNSPECIFIED GASTRITIS TYPE: ICD-10-CM

## 2024-01-23 DIAGNOSIS — R10.13 EPIGASTRIC PAIN: Primary | ICD-10-CM

## 2024-01-23 DIAGNOSIS — R10.12 LEFT UPPER QUADRANT ABDOMINAL PAIN: ICD-10-CM

## 2024-01-23 PROCEDURE — 99214 OFFICE O/P EST MOD 30 MIN: CPT | Performed by: NURSE PRACTITIONER

## 2024-01-23 RX ORDER — PANTOPRAZOLE SODIUM 40 MG/1
40 TABLET, DELAYED RELEASE ORAL 2 TIMES DAILY
Qty: 180 TABLET | Refills: 3 | Status: SHIPPED | OUTPATIENT
Start: 2024-01-23

## 2024-01-23 RX ORDER — PANTOPRAZOLE SODIUM 40 MG/1
40 TABLET, DELAYED RELEASE ORAL 2 TIMES DAILY
Qty: 180 TABLET | Refills: 3 | Status: SHIPPED | OUTPATIENT
Start: 2024-01-23 | End: 2024-01-23 | Stop reason: SDUPTHER

## 2024-01-23 NOTE — PROGRESS NOTES
GASTROENTEROLOGY OFFICE NOTE  Matthew Castillo  [id number]  1995    CARE TEAM  Patient Care Team:  Siddharth Pond DO as PCP - General (Family Medicine)    Referring Provider: Siddharth Pond DO     Chief Complaint   Patient presents with    Abdominal Pain        HISTORY OF PRESENT ILLNESS:  Patient is a 28-year-old male seen today with complaints of left upper quadrant abdominal pain and epigastric pain.  He has a history of esophageal ulcer (2017) and been diagnosed with IBS.      He has been having troubles for the past few months of a burning in his epigastric region.  It sounds like he was experiencing a globus sensation as well but denies dysphagia.  Given his previous history he was started on pantoprazole 40 mg twice daily by his PCP and theses symptoms have nearly, if not entirely resolved.      He continues to have some left upper quadrant abdominal pain.  This pain occurs about 2 hours after he eats and he experiences a very sharp pain in the pinpointed area in the left intercostal margin.  The pain lasts seconds at a time before resolving without intervention that can recur multiple times for several hours.  Once the sharp pain entirely resolves he is left with some soreness or aching sensation for several hours.  He was recently started on amitriptyline 10 mg at bedtime for this pain but does not know as of yet if it has been effective.  He reports that he was previously treated with nortriptyline for IBS related abdominal pain.    PAST MEDICAL HISTORY  Past Medical History:   Diagnosis Date    Esophageal ulcer 2017    Hypertension     Irritable bowel syndrome         PAST SURGICAL HISTORY  Past Surgical History:   Procedure Laterality Date    COLONOSCOPY      UPPER GASTROINTESTINAL ENDOSCOPY          MEDICATIONS:    Current Outpatient Medications:     amitriptyline (ELAVIL) 10 MG tablet, Take 1 tablet by mouth Every Night., Disp: 30 tablet, Rfl: 2    pantoprazole (PROTONIX) 40 MG EC tablet, Take 1  Well Child Visit at 4 Months   WHAT YOU NEED TO KNOW:   What is a well child visit? A well child visit is when your child sees a healthcare provider to prevent health problems  Well child visits are used to track your child's growth and development  It is also a time for you to ask questions and to get information on how to keep your child safe  Write down your questions so you remember to ask them  Your child should have regular well child visits from birth to 16 years  What development milestones may my baby reach at 4 months? Each baby develops at his or her own pace  Your baby might have already reached the following milestones, or he or she may reach them later:  Smile and laugh     in response to someone cooing at him or her    Bring his or her hands together in front of him or her    Reach for objects and grasp them, and then let them go    Bring toys to his or her mouth    Control his or her head when he or she is placed in a seated position    Hold his or her head and chest up and support himself or herself on his or her arms when he or she is placed on his or her tummy    Roll from front to back    What can I do when my baby cries? Your baby may cry because he or she is hungry  He or she may have a wet diaper, or feel hot or cold  He or she may cry for no reason you can find  Your baby may cry more often in the evening or late afternoon  It can be hard to listen to your baby cry and not be able to calm him or her down  Ask for help and take a break if you feel stressed or overwhelmed  Never shake your baby to try to stop his or her crying  This can cause blindness or brain damage  The following may help comfort your baby:  Hold your baby skin to skin and rock him or her, or swaddle him or her in a soft blanket  Gently pat your baby's back or chest  Stroke or rub his or her head  Quietly sing or talk to your baby, or play soft, soothing music      Put your baby in his or her car seat and "tablet by mouth 2 (Two) Times a Day., Disp: 180 tablet, Rfl: 3    docusate sodium (COLACE) 50 MG capsule, Take 1 capsule by mouth Daily. (Patient not taking: Reported on 1/23/2024), Disp: , Rfl:     ALLERGIES  No Known Allergies    FAMILY HISTORY:  Family History   Problem Relation Age of Onset    Diabetes Mother     Colon cancer Mother     No Known Problems Father     Dementia Maternal Grandmother     Lung cancer Maternal Grandfather     Lupus Paternal Grandmother     Irritable bowel syndrome Paternal Grandfather     Crohn's disease Paternal Grandfather     Heart disease Paternal Grandfather        SOCIAL HISTORY  Social History     Socioeconomic History    Marital status:    Tobacco Use    Smoking status: Never    Smokeless tobacco: Former     Types: Chew     Quit date: 3/1/2020   Vaping Use    Vaping Use: Some days    Start date: 3/9/2020    Substances: Nicotine    Devices: Disposable   Substance and Sexual Activity    Alcohol use: Not Currently     Comment: quit 11 weeks ago    Drug use: Never    Sexual activity: Yes     Partners: Female         PHYSICAL EXAM   /71 (BP Location: Left arm, Patient Position: Sitting, Cuff Size: Adult)   Pulse 109   Temp 98.9 °F (37.2 °C) (Temporal)   Ht 180.3 cm (70.98\")   Wt 77.9 kg (171 lb 12.8 oz)   BMI 23.97 kg/m²   Physical Exam  Constitutional:       Appearance: Normal appearance.   HENT:      Head: Normocephalic and atraumatic.   Cardiovascular:      Rate and Rhythm: Normal rate and regular rhythm.   Pulmonary:      Effort: Pulmonary effort is normal.   Abdominal:      General: Bowel sounds are normal. There is no distension.      Palpations: Abdomen is soft.      Tenderness: There is no abdominal tenderness.   Neurological:      Mental Status: He is alert and oriented to person, place, and time.   Psychiatric:         Mood and Affect: Mood normal.         Thought Content: Thought content normal.           ASSESSMENT / PLAN  1.  GERD  - Pantoprazole 40 mg " take him or her for a drive, or go for a stroller ride  Burp your baby to get rid of extra gas  Give your baby a soothing, warm bath  What can I do to keep my baby safe in the car? Always place your baby in a rear-facing car seat  Choose a seat that meets the Federal Motor Vehicle Safety Standard 213  Make sure the child safety seat has a harness and clip  Also make sure that the harness and clips fit snugly against your baby  There should be no more than a finger width of space between the strap and your baby's chest  Ask your healthcare provider for more information on car safety seats  Always put your baby's car seat in the back seat  Never put your baby's car seat in the front  This will help prevent him or her from being injured in an accident  What can I do to keep my baby safe at home? Do not give your baby medicine unless directed by his or her healthcare provider  Ask for directions if you do not know how to give the medicine  If your baby misses a dose, do not double the next dose  Ask how to make up the missed dose  Do not give aspirin to children under 25years of age  Your child could develop Reye syndrome if he takes aspirin  Reye syndrome can cause life-threatening brain and liver damage  Check your child's medicine labels for aspirin, salicylates, or oil of wintergreen  Do not leave your baby on a changing table, couch, bed, or infant seat alone  Your baby could roll or push himself or herself off  Keep one hand on your baby as you change his or her diaper or clothes  Never leave your baby alone in the bathtub or sink  A baby can drown in less than 1 inch of water  Always test the water temperature before you give your baby a bath  Test the water on your wrist before putting your baby in the bath to make sure it is not too hot  If you have a bath thermometer, the water temperature should be 90°F to 100°F (32 3°C to 37 8°C)   Keep your faucet water temperature daily  2.  Left upper quadrant abdominal pain  Likely visceral hypersensitivity  - EGD      Return for Follow up after procedures.    I discussed the patients findings and my recommendations with patient    NAT [place]                     lower than 120°F     Never leave your baby in a playpen or crib with the drop-side down  Your baby could fall and be injured  Make sure the drop-side is locked in place  Do not let your baby use a walker  Walkers are not safe for your baby  Walkers do not help your baby learn to walk  Your baby can roll down the stairs  Walkers also allow your baby to reach higher  Your baby might reach for hot drinks, grab pot handles off the stove, or reach for medicines or other unsafe items  How should I lay my baby down to sleep? It is very important to lay your baby down to sleep in safe surroundings  This can greatly reduce his or her risk for SIDS  Tell grandparents, babysitters, and anyone else who cares for your baby the following rules:  Put your baby on his or her back to sleep  Do this every time he or she sleeps (naps and at night)  Do this even if your baby sleeps more soundly on his or her stomach or side  Your baby is less likely to choke on spit-up or vomit if he or she sleeps on his or her back  Put your baby on a firm, flat surface to sleep  Your baby should sleep in a crib, bassinet, or cradle that meets the safety standards of the Consumer Product Safety Commission ([address] [place])  Do not let him or her sleep on pillows, waterbeds, soft mattresses, quilts, beanbags, or other soft surfaces  Move your baby to his or her bed if he or she falls asleep in a car seat, stroller, or swing  He or she may change positions in a sitting device and not be able to breathe well  Put your baby to sleep in a crib or bassinet that has firm sides  The rails around your baby's crib should not be more than 2? inches apart  A mesh crib should have small openings less than ¼ inch  Put your baby in his or her own bed  A crib or bassinet in your room, near your bed, is the safest place for your baby to sleep  Never let him or her sleep in bed with you  Never let him or her sleep on a couch or recliner      Do not leave soft objects or loose bedding in his or her crib  His or her bed should contain only a mattress covered with a fitted bottom sheet  Use a sheet that is made for the mattress  Do not put pillows, bumpers, comforters, or stuffed animals in the bed  Dress your baby in a sleep sack or other sleep clothing before you put him or her down to sleep  Do not use loose blankets  If you must use a blanket, tuck it around the mattress  Do not let your baby get too hot  Keep the room at a temperature that is comfortable for an adult  Never dress your baby in more than 1 layer more than you would wear  Do not cover your baby's face or head while he or she sleeps  Your baby is too hot if he or she is sweating or his or her chest feels hot  Do not raise the head of your baby's bed  Your baby could slide or roll into a position that makes it hard for him or her to breathe  What do I need to know about feeding my baby? Breast milk or iron-fortified formula is the only food your baby needs for the first 4 to 6 months of life  Breast milk gives your baby the best nutrition  It also has antibodies and other substances that help protect your baby's immune system  Babies should breastfeed for about 10 to 20 minutes or longer on each breast  Your baby will need 8 to 12 feedings every 24 hours  If he or she sleeps for more than 4 hours at one time, wake him or her up to eat  Iron-fortified formula also provides all the nutrients your baby needs  Formula is available in a concentrated liquid or powder form  You need to add water to these formulas  Follow the directions when you mix the formula so your baby gets the right amount of nutrients  There is also a ready-to-feed formula that does not need to be mixed with water  Ask your healthcare provider which formula is right for your baby  As your baby gets older, he or she will drink 26 to 36 ounces each day   When he or she starts to sleep for longer periods, he or she will still need to feed 6 to 8 times in 24 hours  Do not overfeed your baby  Overfeeding means your baby gets too many calories during a feeding  This may cause him or her to gain weight too fast  Do not try to continue to feed your baby when he or she is no longer hungry  Do not add baby cereal to the bottle  Overfeeding can happen if you add baby cereal to formula or breast milk  You can make more if your baby is still hungry after he or she finishes a bottle  Do not use a microwave to heat your baby's bottle  The milk or formula will not heat evenly and will have spots that are very hot  Your baby's face or mouth could be burned  You can warm the milk or formula quickly by placing the bottle in a pot of warm water for a few minutes  Burp your baby during the middle of his or her feeding or after he or she is done  Hold your baby against your shoulder  Put one of your hands under your baby's bottom  Gently rub or pat his or her back with your other hand  You can also sit your baby on your lap with his or her head leaning forward  Support his or her chest and head with your hand  Gently rub or pat his or her back with your other hand  Your baby's neck may not be strong enough to hold his or her head up  Until your baby's neck gets stronger, you must always support his or her head  If your baby's head falls backward, he or she may get a neck injury  Do not prop a bottle in your baby's mouth or let him or her lie flat during a feeding  Your baby can choke in that position  If your child lies down during a feeding, the milk may also flow into his or her middle ear and cause an infection  What do I need to know about peanut allergies? Peanut allergies may be prevented by giving young babies peanut products  If your baby has severe eczema or an egg allergy, he or she is at risk for a peanut allergy  Your baby needs to be tested before he or she has a peanut product   Talk to your baby's healthcare provider  If your baby tests positive, the first peanut product must be given in the provider's office  The first taste may be when your baby is 3to 10months of age  A peanut allergy test is not needed if your baby has mild to moderate eczema  Peanut products can be given around 10months of age  Talk to your baby's provider before you give the first taste  If your baby does not have eczema, talk to his or her provider  He or she may say it is okay to give peanut products at 3to 10months of age  Do not  give your baby chunky peanut butter or whole peanuts  He or she could choke  Give your baby smooth peanut butter or foods made with peanut butter  How can I help my baby get physical activity? Your baby needs physical activity so his or her muscles can develop  Encourage your baby to be active through play  The following are some ways that you can encourage your baby to be active:  Ova Lightning a mobile over your baby's crib  to motivate him or her to reach for it  Gently turn, roll, bounce, and sway your baby  to help increase muscle strength  Place your baby on your lap, facing you  Hold your baby's hands and help him or her stand  Be sure to support his or her head if he or she cannot hold it steady  Play with your baby on the floor  Place your baby on his or her tummy  Tummy time helps your baby learn to hold his or her head up  Put a toy just out of his or her reach  This may motivate him or her to roll over as he or she tries to reach it  What are other ways I can care for my baby? Help your baby develop a healthy sleep-wake cycle  Your baby needs sleep to help him or her stay healthy and grow  Create a routine for bedtime  Bathe and feed your baby right before you put him or her to bed  This will help him or her relax and get to sleep easier  Put your baby in his or her crib when he or she is awake but sleepy  Relieve your baby's teething discomfort with a cold teething ring    Ask your healthcare provider about other ways that you can relieve your baby's teething discomfort  Your baby's first tooth may appear between 3and 6months of age  Some symptoms of teething include drooling, irritability, fussiness, ear rubbing, and sore, tender gums  Read to your baby  This will comfort your baby and help his or her brain develop  Point to pictures as you read  This will help your baby make connections between pictures and words  Have other family members or caregivers read to your baby  Do not smoke near your baby  Do not let anyone else smoke near your baby  Do not smoke in your home or vehicle  Smoke from cigarettes or cigars can cause asthma or breathing problems in your baby  Take an infant CPR and first aid class  These classes will help teach you how to care for your baby in an emergency  Ask your baby's healthcare provider where you can take these classes  How can I care for myself during this time? Go to all postpartum check-up visits  Your healthcare providers will check your health  Tell them if you have any questions or concerns about your health  They can also help you create or update meal plans  This can help you make sure you are getting enough calories and nutrients, especially if you are breastfeeding  Talk to your providers about an exercise plan  Exercise, such as walking, can help increase your energy levels, improve your mood, and manage your weight  Your providers will tell you how much activity to get each day, and which activities are best for you  Find time for yourself  Ask a friend, family member, or your partner to watch the baby  Do activities that you enjoy and help you relax  Consider joining a support group with other women who recently had babies if you have not joined one already  It may be helpful to share information about caring for your babies  You can also talk about how you are feeling emotionally and physically      Talk to your baby's pediatrician about postpartum depression  You may have had screening for postpartum depression during your baby's last well child visit  Screening may also be part of this visit  Screening means your baby's pediatrician will ask if you feel sad, depressed, or very tired  These feelings can be signs of postpartum depression  Tell him or her about any new or worsening problems you or your baby had since your last visit  Also describe anything that makes you feel worse or better  The pediatrician can help you get treatment, such as talk therapy, medicines, or both  What do I need to know about my baby's next well child visit? Your baby's healthcare provider will tell you when to bring your baby in again  The next well child visit is usually at 6 months  Contact your child's healthcare provider if you have questions or concerns about your baby's health or care before the next visit  Your baby may need vaccines at the next well child visit  Your provider will tell you which vaccines your baby needs and when your baby should get them  CARE AGREEMENT:   You have the right to help plan your baby's care  Learn about your baby's health condition and how it may be treated  Discuss treatment options with your baby's healthcare providers to decide what care you want for your baby  The above information is an  only  It is not intended as medical advice for individual conditions or treatments  Talk to your doctor, nurse or pharmacist before following any medical regimen to see if it is safe and effective for you  © Copyright [employer] 2022 Information is for End User's use only and may not be sold, redistributed or otherwise used for commercial purposes   All illustrations and images included in CareNotes® are the copyrighted property of [employer]  or [address] [place], [place] [address]

## 2024-01-31 ENCOUNTER — OFFICE VISIT (OUTPATIENT)
Dept: FAMILY MEDICINE CLINIC | Facility: CLINIC | Age: 29
End: 2024-01-31
Payer: COMMERCIAL

## 2024-01-31 VITALS
HEART RATE: 99 BPM | DIASTOLIC BLOOD PRESSURE: 78 MMHG | HEIGHT: 71 IN | WEIGHT: 168 LBS | BODY MASS INDEX: 23.52 KG/M2 | SYSTOLIC BLOOD PRESSURE: 134 MMHG | TEMPERATURE: 98.4 F

## 2024-01-31 DIAGNOSIS — M79.10 MUSCLE PAIN: ICD-10-CM

## 2024-01-31 DIAGNOSIS — F41.9 ANXIETY: ICD-10-CM

## 2024-01-31 DIAGNOSIS — R00.0 TACHYCARDIA: Primary | ICD-10-CM

## 2024-01-31 DIAGNOSIS — K58.9 IRRITABLE BOWEL SYNDROME WITHOUT DIARRHEA: ICD-10-CM

## 2024-01-31 PROCEDURE — 99214 OFFICE O/P EST MOD 30 MIN: CPT | Performed by: FAMILY MEDICINE

## 2024-01-31 RX ORDER — PROPRANOLOL HYDROCHLORIDE 10 MG/1
10 TABLET ORAL
Qty: 30 TABLET | Refills: 2 | Status: SHIPPED | OUTPATIENT
Start: 2024-01-31

## 2024-01-31 NOTE — PROGRESS NOTES
Follow Up Office Visit      Patient Name: Matthew Castillo  : 1995   MRN: [id number]     Chief Complaint:    Chief Complaint   Patient presents with    Rapid Heart Rate     Started last week. He hd=ad a stressful morning and while sitting in traffic his rest HR was 138. The next day he noticed that while seating it was about 80 to 90 range. He said that it high for him. He stays around 71. Last night to walk 600 feet his HR went from 78 to 151.     Anxiety     Wants to discuss trying a different medication     Irritable Bowel Syndrome    Muscle Pain     More of a muscle soreness. Calf muscle and biceps. He has been staying hydrated and staying up on his electrolytes        History of Present Illness: Matthew Castillo is a 28 y.o. male who is here today to follow up with rapid heart rate when walking, anxiety, irritable syndrome, and some muscle cramps in biceps and legs.    Patient reports that recently he has been having fast heart rate and noticed it with his watch.  Does periodically may get dizzy when standing up.  Patient is concerned about POTS.  Patient says he been trying to stay well-hydrated.  Is a high endurance athlete and has a resting heart rate is fairly low.  Says this abnormal elevation of his heart is abnormal for him.  Patient stands up, he can have a heart rate up to 151.  He says at times it can be 140 fairly steadily.  Currently today when he comes in the office it is over 100.    Patient reports that when he was cycling, his heart rate did not go over 117.  Says he cycled for a very long time and it stayed this rate.  Patient says after he off the bike, his heart rate went up.    Patient does have anxiety.  Patient has anxiety about his heart rate.  Patient may have a fast heart rate due to anxiety.  Patient interested in medication.    Your bowel symptoms have been more stable recently.    Some mild muscle soreness and aches in his legs and arms since his fast heart rate has been  "happening.  Patient does have anxiety.      Physical exam: Patient heart exam showed tachycardia with regular rhythm.  Affect anxious.      Subjective        I have reviewed and the following portions of the patient's history were updated as appropriate: past family history, past medical history, past social history, past surgical history and problem list.    Medications:     Current Outpatient Medications:     propranolol (INDERAL) 10 MG tablet, Take 1 tablet by mouth 3 (Three) Times a Week if Needed (anxiety or fast hear rate)., Disp: 30 tablet, Rfl: 2    Allergies:   No Known Allergies    Objective     Physical Exam: Please see above  Vital Signs:   Vitals:    01/31/24 0809   BP: 134/78   Pulse: 99   Temp: 98.4 °F (36.9 °C)   TempSrc: Infrared   Weight: 76.2 kg (168 lb)   Height: 180.3 cm (71\")     Body mass index is 23.43 kg/m².          Assessment / Plan      Assessment/Plan:   Diagnoses and all orders for this visit:    1. Tachycardia (Primary)  -     Holter Monitor - 72 Hour Up To 15 Days; Future  -     propranolol (INDERAL) 10 MG tablet; Take 1 tablet by mouth 3 (Three) Times a Week if Needed (anxiety or fast hear rate).  Dispense: 30 tablet; Refill: 2    2. Anxiety  -     propranolol (INDERAL) 10 MG tablet; Take 1 tablet by mouth 3 (Three) Times a Week if Needed (anxiety or fast hear rate).  Dispense: 30 tablet; Refill: 2    3. Irritable bowel syndrome without diarrhea    4. Muscle pain    Acute issues of muscle pain and tachycardia addressed today.  Tachycardia seems to be fairly regular, so what I recommend is a Holter monitor to see if there is any abnormal rhythms.  He can use propranolol as needed for tachycardia but we also prescribed propranolol for another reason.  Muscle pain diagnosis is unsure at this time.  Could be related to anxiety or to dehydration.  Recommend monitoring going forward without any treatment except for proper hydration.    Chronic issues of anxiety and irritable symptoms " discussed today.  Start propranolol as needed for anxiety.  Patient's IBS seems to be fairly stable so no further treatment discussed.      Chronic condition anxiety discussed and propranolol prescribed.    Follow Up:   No follow-ups on file.    Siddharth Pond DO  [facility] [place]

## 2024-02-12 ENCOUNTER — TELEPHONE (OUTPATIENT)
Dept: FAMILY MEDICINE CLINIC | Facility: CLINIC | Age: 29
End: 2024-02-12

## 2024-02-12 DIAGNOSIS — F41.9 ANXIETY: ICD-10-CM

## 2024-02-12 DIAGNOSIS — R00.0 TACHYCARDIA: ICD-10-CM

## 2024-02-12 RX ORDER — PROPRANOLOL HYDROCHLORIDE 10 MG/1
10 TABLET ORAL 3 TIMES DAILY PRN
Qty: 30 TABLET | Refills: 2 | Status: SHIPPED | OUTPATIENT
Start: 2024-02-12

## 2024-02-12 NOTE — TELEPHONE ENCOUNTER
Caller: Matthew Castillo    Relationship: Self    Best call back number: [phone]    Which medication are you concerned about: PROPANOLOL    Who prescribed you this medication: DR ANDUJAR    When did you start taking this medication: 02/11/2024    What are your concerns: PATIENT STATES THIS MEDICATION SHOULD BE FOR 3 TIMES A DAY NOT 3 TIMES A WEEK. PLEASE SEND NEW SCRIPT TO JERAMIE . PLEASE ADVISE

## 2024-02-22 DIAGNOSIS — R00.2 PALPITATION: ICD-10-CM

## 2024-02-22 DIAGNOSIS — R00.0 TACHYCARDIA: Primary | ICD-10-CM

## 2024-02-23 ENCOUNTER — LAB (OUTPATIENT)
Dept: LAB | Facility: HOSPITAL | Age: 29
End: 2024-02-23
Payer: COMMERCIAL

## 2024-02-23 DIAGNOSIS — R00.0 TACHYCARDIA: ICD-10-CM

## 2024-02-23 DIAGNOSIS — R00.2 PALPITATION: ICD-10-CM

## 2024-02-23 LAB
ALBUMIN SERPL-MCNC: 4.7 G/DL (ref 3.5–5.2)
ALBUMIN/GLOB SERPL: 2 G/DL
ALP SERPL-CCNC: 78 U/L (ref 39–117)
ALT SERPL W P-5'-P-CCNC: 15 U/L (ref 1–41)
ANION GAP SERPL CALCULATED.3IONS-SCNC: 12 MMOL/L (ref 5–15)
AST SERPL-CCNC: 14 U/L (ref 1–40)
BASOPHILS # BLD AUTO: 0.05 10*3/MM3 (ref 0–0.2)
BASOPHILS NFR BLD AUTO: 0.9 % (ref 0–1.5)
BILIRUB SERPL-MCNC: 0.6 MG/DL (ref 0–1.2)
BUN SERPL-MCNC: 11 MG/DL (ref 6–20)
BUN/CREAT SERPL: 8.9 (ref 7–25)
CALCIUM SPEC-SCNC: 9.7 MG/DL (ref 8.6–10.5)
CHLORIDE SERPL-SCNC: 106 MMOL/L (ref 98–107)
CO2 SERPL-SCNC: 25 MMOL/L (ref 22–29)
CREAT SERPL-MCNC: 1.23 MG/DL (ref 0.76–1.27)
DEPRECATED RDW RBC AUTO: 39.7 FL (ref 37–54)
EGFRCR SERPLBLD CKD-EPI 2021: 81 ML/MIN/1.73
EOSINOPHIL # BLD AUTO: 0.56 10*3/MM3 (ref 0–0.4)
EOSINOPHIL NFR BLD AUTO: 9.8 % (ref 0.3–6.2)
ERYTHROCYTE [DISTWIDTH] IN BLOOD BY AUTOMATED COUNT: 12.1 % (ref 12.3–15.4)
GLOBULIN UR ELPH-MCNC: 2.3 GM/DL
GLUCOSE SERPL-MCNC: 70 MG/DL (ref 65–99)
HCT VFR BLD AUTO: 44 % (ref 37.5–51)
HGB BLD-MCNC: 15.3 G/DL (ref 13–17.7)
IMM GRANULOCYTES # BLD AUTO: 0.01 10*3/MM3 (ref 0–0.05)
IMM GRANULOCYTES NFR BLD AUTO: 0.2 % (ref 0–0.5)
LYMPHOCYTES # BLD AUTO: 1.67 10*3/MM3 (ref 0.7–3.1)
LYMPHOCYTES NFR BLD AUTO: 29.2 % (ref 19.6–45.3)
MAGNESIUM SERPL-MCNC: 1.9 MG/DL (ref 1.6–2.6)
MCH RBC QN AUTO: 31.3 PG (ref 26.6–33)
MCHC RBC AUTO-ENTMCNC: 34.8 G/DL (ref 31.5–35.7)
MCV RBC AUTO: 90 FL (ref 79–97)
MONOCYTES # BLD AUTO: 0.43 10*3/MM3 (ref 0.1–0.9)
MONOCYTES NFR BLD AUTO: 7.5 % (ref 5–12)
NEUTROPHILS NFR BLD AUTO: 3 10*3/MM3 (ref 1.7–7)
NEUTROPHILS NFR BLD AUTO: 52.4 % (ref 42.7–76)
NRBC BLD AUTO-RTO: 0 /100 WBC (ref 0–0.2)
PLATELET # BLD AUTO: 256 10*3/MM3 (ref 140–450)
PMV BLD AUTO: 9.8 FL (ref 6–12)
POTASSIUM SERPL-SCNC: 4.2 MMOL/L (ref 3.5–5.2)
PROT SERPL-MCNC: 7 G/DL (ref 6–8.5)
RBC # BLD AUTO: 4.89 10*6/MM3 (ref 4.14–5.8)
SODIUM SERPL-SCNC: 143 MMOL/L (ref 136–145)
TSH SERPL DL<=0.05 MIU/L-ACNC: 1.76 UIU/ML (ref 0.27–4.2)
WBC NRBC COR # BLD AUTO: 5.72 10*3/MM3 (ref 3.4–10.8)

## 2024-02-23 PROCEDURE — 36415 COLL VENOUS BLD VENIPUNCTURE: CPT

## 2024-02-23 PROCEDURE — 83735 ASSAY OF MAGNESIUM: CPT

## 2024-02-23 PROCEDURE — 80053 COMPREHEN METABOLIC PANEL: CPT

## 2024-02-23 PROCEDURE — 84443 ASSAY THYROID STIM HORMONE: CPT

## 2024-02-23 PROCEDURE — 85025 COMPLETE CBC W/AUTO DIFF WBC: CPT

## 2024-03-06 DIAGNOSIS — R00.2 PALPITATION: ICD-10-CM

## 2024-03-06 DIAGNOSIS — R00.1 BRADYCARDIA: ICD-10-CM

## 2024-03-06 DIAGNOSIS — R00.0 TACHYCARDIA: Primary | ICD-10-CM

## 2024-03-11 ENCOUNTER — OFFICE VISIT (OUTPATIENT)
Dept: CARDIOLOGY | Facility: CLINIC | Age: 29
End: 2024-03-11
Payer: COMMERCIAL

## 2024-03-11 ENCOUNTER — PATIENT ROUNDING (BHMG ONLY) (OUTPATIENT)
Dept: CARDIOLOGY | Facility: CLINIC | Age: 29
End: 2024-03-11
Payer: COMMERCIAL

## 2024-03-11 VITALS
BODY MASS INDEX: 24.78 KG/M2 | WEIGHT: 177 LBS | SYSTOLIC BLOOD PRESSURE: 110 MMHG | OXYGEN SATURATION: 99 % | HEART RATE: 91 BPM | DIASTOLIC BLOOD PRESSURE: 66 MMHG | HEIGHT: 71 IN

## 2024-03-11 DIAGNOSIS — G90.1 DYSAUTONOMIA: Primary | ICD-10-CM

## 2024-03-11 PROCEDURE — 99203 OFFICE O/P NEW LOW 30 MIN: CPT | Performed by: PHYSICIAN ASSISTANT

## 2024-03-11 PROCEDURE — 93000 ELECTROCARDIOGRAM COMPLETE: CPT | Performed by: PHYSICIAN ASSISTANT

## 2024-03-11 RX ORDER — PANTOPRAZOLE SODIUM 20 MG/1
20 TABLET, DELAYED RELEASE ORAL DAILY
COMMUNITY

## 2024-03-11 NOTE — PROGRESS NOTES
March 11, 2024    Hello, may I speak with Matthew Castillo?    My name is FELICITAS      I am  with [facility]  [address]  [facility] [address] [phone].    Before we get started may I verify your date of birth? 1995    I am calling to officially welcome you to our practice and ask about your recent visit. Is this a good time to talk? yes    Tell me about your visit with us. What things went well?  EVERYONE WAS KNOWLEDGEABLE, WILL S. WAS AWESOME AND REASSURING, EVERYTHING WENT SMOOTH.          We're always looking for ways to make our patients' experiences even better. Do you have recommendations on ways we may improve?  no    Overall were you satisfied with your first visit to our practice? yes       I appreciate you taking the time to speak with me today. Is there anything else I can do for you? no      Thank you, and have a great day.

## 2024-03-11 NOTE — PROGRESS NOTES
[facility]  INITIAL OFFICE CONSULT      Matthew Castillo  1995  PCP: Siddharth Pond DO    SUBJECTIVE:   Matthew Castillo is a 28 y.o. male seen for a consultation visit regarding the following:     Chief Complaint:   Chief Complaint   Patient presents with    Rapid Heart Rate    Slow Heart Rate    Palpitations          Consultation is requested by Siddharth Pond DO for evaluation of Rapid Heart Rate, Slow Heart Rate, and Palpitations        History:  Pleasant 28-year-old gentleman works for a [employer] company in the [employer] department.  He reports episodes of tachypalpitations associated multiple different activities.  He has noted this primarily about tracking his heart rate monitor device or a watch.  He does not [employer] feel symptoms but he can be sitting or resting at nighttime and watch his heart rate go up on his monitor without having any symptoms.  This is also occurred in multiple different various back settings.  He is trying to get back in biking when she has been an avid exercise induced in the past.  He has run marathons in the past he also likes to cycle frequently.  He is try to get back on this and cycled recently up to 10 miles and did well with this.  He does feel like his heart rate is variable at times.  He has had no chest pain dizziness near syncope or syncope events.  He has no heart failure symptoms like orthopnea PND peripheral edema.  He wore a 7-day ZIO monitor revealing heart rate variability but average heart rate stable at 76 bpm.  He did have occasional PACs that he did note on his monitor by activating heart rate monitor response.      Cardiac PMH: (Old records have been reviewed and summarized below)  Palpitations, Heart rate varibility, Zio monitor February 19, 2024 monitor worn for 7 days average heart rate 70 bpm.  Predominant rhythm sinus rhythm with occasional sinus arrhythmia with no SVT events.  Occasional PACs correlate with  symptoms  GERD  Prior ETOH use  GERD, Esophagitis   Anxiety, depression        Past Medical History, Past Surgical History, Family history, Social History, and Medications were all reviewed with the patient today and updated as necessary.     Current Outpatient Medications   Medication Sig Dispense Refill    pantoprazole (PROTONIX) 20 MG EC tablet Take 1 tablet by mouth Daily.      propranolol (INDERAL) 10 MG tablet Take 1 tablet by mouth 3 (Three) Times a Day As Needed (anxiety or fast hear rate). (Patient not taking: Reported on 3/11/2024) 30 tablet 2     No current facility-administered medications for this visit.     No Known Allergies      Past Medical History:   Diagnosis Date    Esophageal ulcer 2017    Hypertension     Irritable bowel syndrome      Past Surgical History:   Procedure Laterality Date    COLONOSCOPY      UPPER GASTROINTESTINAL ENDOSCOPY       Family History   Problem Relation Age of Onset    Diabetes Mother     Colon cancer Mother     Hypertension Father     Dementia Maternal Grandmother     Lung cancer Maternal Grandfather     Lupus Paternal Grandmother     Heart attack Paternal Grandmother     Irritable bowel syndrome Paternal Grandfather     Crohn's disease Paternal Grandfather     Heart disease Paternal Grandfather         Multiple Aneurysms     Social History     Tobacco Use    Smoking status: Never    Smokeless tobacco: Former     Types: Chew     Quit date: 3/1/2020   Substance Use Topics    Alcohol use: Not Currently     Comment: quit 11 weeks ago       ROS:  Review of Symptoms:  General: no recent weight loss/gain, weakness or fatigue  Skin: no rashes, lumps, or other skin changes  HEENT: no dizziness, lightheadedness, or vision changes  Respiratory: no cough or hemoptysis  Cardiovascular: + palpitations, and tachycardia  Gastrointestinal: no black/tarry stools or diarrhea  Urinary: no change in frequency or urgency  Peripheral Vascular: no claudication or leg cramps  Musculoskeletal:  "no muscle or joint pain/stiffness  Psychiatric: no depression or excessive stress  Neurological: no sensory or motor loss, no syncope  Hematologic: no anemia, easy bruising or bleeding  Endocrine: no thyroid problems, nor heat or cold intolerance         PHYSICAL EXAM:   /66 (BP Location: Right arm, Patient Position: Sitting)   Pulse 91   Ht 180.3 cm (71\")   Wt 80.3 kg (177 lb)   SpO2 99%   BMI 24.69 kg/m²      Wt Readings from Last 5 Encounters:   03/11/24 80.3 kg (177 lb)   01/31/24 76.2 kg (168 lb)   01/23/24 77.9 kg (171 lb 12.8 oz)   01/02/24 78 kg (172 lb)   12/06/23 75.8 kg (167 lb)     BP Readings from Last 5 Encounters:   03/11/24 110/66   01/31/24 134/78   01/23/24 135/71   01/02/24 104/64   12/06/23 124/68       General-Well Nourished, Well developed  Eyes - PERRLA  Neck- supple, No mass  CV- regular rate and rhythm, no MRG  Lung- clear bilaterally  Abd- soft, +BS  Musc/skel - Norm strength and range of motion  Skin- warm and dry  Neuro - Alert & Oriented x 3, appropriate mood.    Patient's external notes were reviewed.  Independent interpretation of test performed by another physician in facility were reviewed.  Outside laboratory data was also reviewed.    Medical problems and test results were reviewed with the patient today.     Results for orders placed or performed in visit on 02/23/24   Comprehensive Metabolic Panel    Specimen: Blood   Result Value Ref Range    Glucose 70 65 - 99 mg/dL    BUN 11 6 - 20 mg/dL    Creatinine 1.23 0.76 - 1.27 mg/dL    Sodium 143 136 - 145 mmol/L    Potassium 4.2 3.5 - 5.2 mmol/L    Chloride 106 98 - 107 mmol/L    CO2 25.0 22.0 - 29.0 mmol/L    Calcium 9.7 8.6 - 10.5 mg/dL    Total Protein 7.0 6.0 - 8.5 g/dL    Albumin 4.7 3.5 - 5.2 g/dL    ALT (SGPT) 15 1 - 41 U/L    AST (SGOT) 14 1 - 40 U/L    Alkaline Phosphatase 78 39 - 117 U/L    Total Bilirubin 0.6 0.0 - 1.2 mg/dL    Globulin 2.3 gm/dL    A/G Ratio 2.0 g/dL    BUN/Creatinine Ratio 8.9 7.0 - 25.0    " Anion Gap 12.0 5.0 - 15.0 mmol/L    eGFR 81.0 >60.0 mL/min/1.73   TSH Rfx On Abnormal To Free T4    Specimen: Blood   Result Value Ref Range    TSH 1.760 0.270 - 4.200 uIU/mL   Magnesium    Specimen: Blood   Result Value Ref Range    Magnesium 1.9 1.6 - 2.6 mg/dL   CBC Auto Differential    Specimen: Blood   Result Value Ref Range    WBC 5.72 3.40 - 10.80 10*3/mm3    RBC 4.89 4.14 - 5.80 10*6/mm3    Hemoglobin 15.3 13.0 - 17.7 g/dL    Hematocrit 44.0 37.5 - 51.0 %    MCV 90.0 79.0 - 97.0 fL    MCH 31.3 26.6 - 33.0 pg    MCHC 34.8 31.5 - 35.7 g/dL    RDW 12.1 (L) 12.3 - 15.4 %    RDW-SD 39.7 37.0 - 54.0 fl    MPV 9.8 6.0 - 12.0 fL    Platelets 256 140 - 450 10*3/mm3    Neutrophil % 52.4 42.7 - 76.0 %    Lymphocyte % 29.2 19.6 - 45.3 %    Monocyte % 7.5 5.0 - 12.0 %    Eosinophil % 9.8 (H) 0.3 - 6.2 %    Basophil % 0.9 0.0 - 1.5 %    Immature Grans % 0.2 0.0 - 0.5 %    Neutrophils, Absolute 3.00 1.70 - 7.00 10*3/mm3    Lymphocytes, Absolute 1.67 0.70 - 3.10 10*3/mm3    Monocytes, Absolute 0.43 0.10 - 0.90 10*3/mm3    Eosinophils, Absolute 0.56 (H) 0.00 - 0.40 10*3/mm3    Basophils, Absolute 0.05 0.00 - 0.20 10*3/mm3    Immature Grans, Absolute 0.01 0.00 - 0.05 10*3/mm3    nRBC 0.0 0.0 - 0.2 /100 WBC         Lab Results   Component Value Date    CHOL 105 05/04/2022    HDL 43 05/04/2022    LDL 53 05/04/2022    VLDL 9 05/04/2022       EKG:  (EKG/Tracing has been independently visualized by me and summarized below)      ECG 12 Lead    Date/Time: 3/11/2024 3:59 PM  Performed by: Titi Arceo PA    Authorized by: Titi Arceo PA  Comparison: not compared with previous ECG   Rhythm: sinus rhythm  Rate: normal  Conduction: conduction normal  ST Segments: ST segments normal  QRS axis: normal    Clinical impression: normal ECG          ASSESSMENT   1. Palplitations: ZIO monitor revealing heart rate variability occasional PACs.  Overall stable heart monitor.  2.  Marginal blood pressure      PLAN  Reviewed Holter  monitor EKG with patient.  Does appear to have some automatic innominate dysfunction symptoms.  No clear evidence of any significant arrhythmias on monitor.  Does have occasional PACs but these are pretty low burden at less than 1%.  Would recommend continue to focus on hydration Gatorade Powerade, exercise as he is doing ramping up his activity level.  Return follow-up or office as needed basis or sooner if any change in symptoms.           Cardiology/Electrophysiology  03/11/24  08:44 EDT  Electronically signed by EDGARDO Dorsey, 03/11/24, 4:00 PM EDT.

## 2024-03-22 ENCOUNTER — OUTSIDE FACILITY SERVICE (OUTPATIENT)
Dept: GASTROENTEROLOGY | Facility: CLINIC | Age: 29
End: 2024-03-22
Payer: COMMERCIAL

## 2024-03-22 PROCEDURE — 88305 TISSUE EXAM BY PATHOLOGIST: CPT | Performed by: INTERNAL MEDICINE

## 2024-03-22 PROCEDURE — 43239 EGD BIOPSY SINGLE/MULTIPLE: CPT | Performed by: INTERNAL MEDICINE

## 2024-03-22 RX ORDER — PANTOPRAZOLE SODIUM 40 MG/1
40 TABLET, DELAYED RELEASE ORAL 2 TIMES DAILY
Qty: 60 TABLET | Refills: 11 | Status: SHIPPED | OUTPATIENT
Start: 2024-03-22

## 2024-03-25 ENCOUNTER — LAB REQUISITION (OUTPATIENT)
Dept: LAB | Facility: HOSPITAL | Age: 29
End: 2024-03-25
Payer: COMMERCIAL

## 2024-03-25 DIAGNOSIS — R10.13 EPIGASTRIC PAIN: ICD-10-CM

## 2024-03-25 DIAGNOSIS — K22.89 OTHER SPECIFIED DISEASE OF ESOPHAGUS: ICD-10-CM

## 2024-03-25 DIAGNOSIS — K44.9 DIAPHRAGMATIC HERNIA WITHOUT OBSTRUCTION OR GANGRENE: ICD-10-CM

## 2024-03-26 LAB — REF LAB TEST METHOD: NORMAL

## 2024-04-08 ENCOUNTER — TELEPHONE (OUTPATIENT)
Dept: GASTROENTEROLOGY | Facility: CLINIC | Age: 29
End: 2024-04-08
Payer: COMMERCIAL

## 2024-04-08 NOTE — TELEPHONE ENCOUNTER
"Spoke with patient and he is reporting that he is doing better and has adjusted some things on his end and will continue with current course.If anything changes he will reach out.     Regarding: FW: Stool Question   Contact: [phone]  Tabby  Please advise patient that some slight change in stool color in and of itself is not particular concerning.  If he is still having some loose stools and believes that is related to the pantoprazole we can change him over to an alternative proton pump inhibitor as, sometimes, proton pump inhibitors can cause diarrhea.  The change in stool order in and of itself is not clinically significant.  ----- Message -----  From: Gina Xie MA  Sent: 4/5/2024   5:13 PM EDT  To: Raúl Gonzalez MD  Subject: FW: Stool Question                                 ----- Message -----  From: Oliver Mac MA  Sent: 4/5/2024  12:28 PM EDT  To: [facility] Clinical [facility]  Subject: Stool Question                                   ----- Message from Oliver Mac MA sent at 4/5/2024 12:28 PM EDT -----       ----- Message from Matthew Castillo to Siddharth Pond DO sent at 4/5/2024 11:01 AM -----   That's who I intended to but \"my chart\" didn't list him as an option to message.       ----- Message -----       From:Laura BRISENO       Sent:4/5/2024 10:52 AM EDT         To:Matthew Castillo    Subject:Stool Question     Have you reached out to  office about this?      ----- Message -----       From:Matthew Castillo       Sent:4/5/2024  9:53 AM EDT         To:Siddharth Pond    Subject:Stool Question     HeDr. Lisa Petersen Dr. had me up my pantoprazole after the EGD. I haven't really changed anything else but I'm having loose, lighter colored stools that are particularly smelly.    I can't tell exactly what color they are because I'm colorblind but I think they're more light brown than anything.    Thanks,  "

## 2024-04-30 ENCOUNTER — OFFICE VISIT (OUTPATIENT)
Dept: GASTROENTEROLOGY | Facility: CLINIC | Age: 29
End: 2024-04-30
Payer: COMMERCIAL

## 2024-04-30 VITALS
SYSTOLIC BLOOD PRESSURE: 148 MMHG | BODY MASS INDEX: 24.22 KG/M2 | DIASTOLIC BLOOD PRESSURE: 90 MMHG | WEIGHT: 173 LBS | HEART RATE: 103 BPM | TEMPERATURE: 98.2 F | HEIGHT: 71 IN

## 2024-04-30 DIAGNOSIS — K21.00 GASTROESOPHAGEAL REFLUX DISEASE WITH ESOPHAGITIS WITHOUT HEMORRHAGE: ICD-10-CM

## 2024-04-30 DIAGNOSIS — Z80.0 FAMILY HISTORY OF GI MALIGNANCY: ICD-10-CM

## 2024-04-30 DIAGNOSIS — R10.13 EPIGASTRIC PAIN: Primary | ICD-10-CM

## 2024-04-30 DIAGNOSIS — K22.10 EROSIVE ESOPHAGITIS: ICD-10-CM

## 2024-04-30 PROCEDURE — 99214 OFFICE O/P EST MOD 30 MIN: CPT | Performed by: INTERNAL MEDICINE

## 2024-04-30 NOTE — PROGRESS NOTES
"GASTROENTEROLOGY OFFICE NOTE  Matthew Castillo  [id number]  1995      Chief Complaint   Patient presents with    Epigastric pain and abdominal cramps        HISTORY OF PRESENT ILLNESS:  28-year-old white male presents for follow-up after initially being seen in our office on January 23, 2024 by NAT Hunt when he presented with abdominal pain mostly in the left upper quadrant and epigastrium.  History of esophageal ulcer in 2017 and diagnosis of irritable bowel syndrome are present at the time.  He does have a history of LA grade a reflux esophagitis on 2017 EGD performed at an outside institution ([facility]).  Colonoscopy at that same time was within normal limits.    Patient has been having a few months of epigastric burning type sensation in the globus sensation without dysphagia to solids.  He been started on pantoprazole 40 mg p.o. twice daily and symptoms had markedly improved with this regimen.  He is continuing to have some left upper quadrant abdominal pain postprandially and was also started on amitriptyline 10 mg p.o. nightly.    He underwent EGD on March 22, 2024.  Biopsies returned negative for Villanueva's esophagus.  Small sliding hiatal hernia was noted.    He presents today stating that he is doing better.  Previously his symptoms were daily.  They were mild and would last hours at a time and would have multiple episodes in the day.  Currently symptoms may not occur for weeks at a time and seems to occur last when he ate pickled jalapenos.  Otherwise he seems to be doing well.    Abdominal cramping have \"eased\".  He denies dysphagia, odynophagia, early satiety, unexplained weight loss, melena or bright red blood per rectum.        PAST MEDICAL HISTORY  Past Medical History:    Esophageal ulcer    Hypertension    Irritable bowel syndrome        PAST SURGICAL HISTORY  Past Surgical History:    COLONOSCOPY    UPPER GASTROINTESTINAL ENDOSCOPY    " "    MEDICATIONS:    Current Outpatient Medications:     pantoprazole (PROTONIX) 40 MG EC tablet, Take 1 tablet by mouth 2 (Two) Times a Day., Disp: 60 tablet, Rfl: 11    propranolol (INDERAL) 10 MG tablet, Take 1 tablet by mouth 3 (Three) Times a Day As Needed (anxiety or fast hear rate). (Patient not taking: Reported on 3/11/2024), Disp: 30 tablet, Rfl: 2    ALLERGIES  has No Known Allergies.    FAMILY HISTORY:  Cancer-related family history includes Colon cancer in his mother; Lung cancer in his maternal grandfather.  Colon Cancer-related family history includes Colon cancer in his mother.    SOCIAL HISTORY  He  reports that he has never smoked. He quit smokeless tobacco use about 4 years ago.  His smokeless tobacco use included chew. He reports that he does not currently use alcohol. He reports that he does not use drugs.     PHYSICAL EXAM   /90 (BP Location: Left arm, Patient Position: Sitting, Cuff Size: Adult)   Pulse 103   Temp 98.2 °F (36.8 °C) (Infrared)   Ht 180.3 cm (71\")   Wt 78.5 kg (173 lb)   BMI 24.13 kg/m²   General: Pleasant, no apparent acute distress.  Alert and oriented.  HEENT: Anicteric sclera  Lungs: Grossly normal respiration without labored breathing or audible wheezing noted.  Speaking in full sentences  Abdomen: Without gross or obvious distention  Neurologic: Normal cognition and affect.  Alert and oriented       ASSESSMENT  1.-Epigastric pain resolved with high-dose proton pump inhibitor.  Given his endoscopically documented erosive esophagitis on his 2017 EGD I believe his problems are simply related chronic gastroesophageal reflux disease complicated by erosive esophagitis.  He will likely need to be on a proton pump inhibitor long-term but I have asked him to drop the twice daily dosing down to once a day dosing and after a month or 2 if his symptoms remain under good control he can taper down to as needed dosing but, again I suspect he may need to be on a low dose " consistently at the very least.  2.-Greater than average risk for colon cancer.  Patient's mother had colon cancer.  Repeat colonoscopy at age 38    PLAN  1.-Continue pantoprazole 40 mg p.o. twice daily and taper down as symptoms allow  2.-Colonoscopy due at age 38  3.-GI follow-up as needed      Raúl Gonzalez MD  4/30/2024   13:41 EDT

## 2024-05-05 PROBLEM — K22.10 EROSIVE ESOPHAGITIS: Status: ACTIVE | Noted: 2024-05-05

## 2024-05-05 PROBLEM — Z80.0 FAMILY HISTORY OF GI MALIGNANCY: Status: ACTIVE | Noted: 2024-05-05

## 2024-05-05 PROBLEM — K21.00 GASTROESOPHAGEAL REFLUX DISEASE WITH ESOPHAGITIS WITHOUT HEMORRHAGE: Status: ACTIVE | Noted: 2024-05-05

## 2024-05-05 PROBLEM — R10.13 EPIGASTRIC PAIN: Status: ACTIVE | Noted: 2024-05-05

## 2024-05-21 ENCOUNTER — OFFICE VISIT (OUTPATIENT)
Dept: FAMILY MEDICINE CLINIC | Facility: CLINIC | Age: 29
End: 2024-05-21
Payer: COMMERCIAL

## 2024-05-21 VITALS
HEIGHT: 71 IN | BODY MASS INDEX: 23.38 KG/M2 | DIASTOLIC BLOOD PRESSURE: 76 MMHG | HEART RATE: 105 BPM | OXYGEN SATURATION: 99 % | SYSTOLIC BLOOD PRESSURE: 122 MMHG | WEIGHT: 167 LBS | TEMPERATURE: 99.8 F

## 2024-05-21 DIAGNOSIS — F41.9 ANXIETY: Primary | ICD-10-CM

## 2024-05-21 DIAGNOSIS — R79.89 ABNORMAL CBC: ICD-10-CM

## 2024-05-21 PROCEDURE — 99214 OFFICE O/P EST MOD 30 MIN: CPT | Performed by: FAMILY MEDICINE

## 2024-05-21 RX ORDER — ESCITALOPRAM OXALATE 5 MG/1
5 TABLET ORAL DAILY
Qty: 30 TABLET | Refills: 2 | Status: SHIPPED | OUTPATIENT
Start: 2024-05-21

## 2024-05-21 RX ORDER — CETIRIZINE HYDROCHLORIDE 10 MG/1
10 TABLET ORAL DAILY
COMMUNITY
Start: 2024-05-19

## 2024-05-21 NOTE — PROGRESS NOTES
"     Follow Up Office Visit      Patient Name: Matthew Castillo  : 1995   MRN: [id number]     Chief Complaint:    Chief Complaint   Patient presents with    Dizziness     SOA    Anxiety       History of Present Illness: Matthew Castillo is a 28 y.o. male who is here today to follow up with anxiety with symptoms including dizziness, ruminating thoughts, and fast heart rate. He has been to cardiology and GI doctors. He rides his bike a lot. He has congestion now.  Patient would like to start medication for anxiety.  He is followed up with all of the providers as mentioned.  He has elevated eosinophils on lab work.      Physical exam: anxious affect      Subjective        I have reviewed and the following portions of the patient's history were updated as appropriate: past family history, past medical history, past social history, past surgical history and problem list.    Medications:     Current Outpatient Medications:     cetirizine (ZyrTEC Allergy) 10 MG tablet, Take 1 tablet by mouth Daily., Disp: , Rfl:     pantoprazole (PROTONIX) 40 MG EC tablet, Take 1 tablet by mouth 2 (Two) Times a Day. (Patient taking differently: Take 1 tablet by mouth 2 (Two) Times a Day. Taking 1 a day.), Disp: 60 tablet, Rfl: 11    escitalopram (Lexapro) 5 MG tablet, Take 1 tablet by mouth Daily., Disp: 30 tablet, Rfl: 2    Allergies:   No Known Allergies    Objective     Physical Exam: Please see above  Vital Signs:   Vitals:    24 1629   BP: 122/76   Pulse: 105   Temp: 99.8 °F (37.7 °C)   SpO2: 99%   Weight: 75.8 kg (167 lb)   Height: 180.3 cm (71\")     Body mass index is 23.29 kg/m².          Assessment / Plan      Assessment/Plan:   Diagnoses and all orders for this visit:    1. Anxiety (Primary)  -     escitalopram (Lexapro) 5 MG tablet; Take 1 tablet by mouth Daily.  Dispense: 30 tablet; Refill: 2    2. Abnormal CBC  -     CBC & Differential; Future          Anxiety - will start ssri.  Patient given " counseling worksheet that has counseling services listed.  He was advised to pick counseling service to follow-up with.  Patient to follow-up in 3 months for reassessment of anxiety.  Patient can call for increased dosing of Lexapro if it feels like is not working after 2 weeks.  If he has side effects we can also stop the medication and start Zoloft without seeing the patient back.  Trying 2-3 medications over the next 3 months-would recommend GeneSight testing if he is having side effects for bad response from medication.      On his CBC eosinophils were noted to be high so we will recheck those today.  Will get a peripheral smear and electrophoresis if it is persistently up.  I will think this is related to his current symptoms, but monitoring this going forward will tell us more information.      Discussed his chronic condition of anxiety and started Lexapro.        Follow Up:   Return in 3 months (on 8/21/2024) for Labs today.    Siddharth Pond DO  [facility] [place]

## 2024-05-28 ENCOUNTER — LAB (OUTPATIENT)
Dept: LAB | Facility: HOSPITAL | Age: 29
End: 2024-05-28
Payer: COMMERCIAL

## 2024-05-28 DIAGNOSIS — R79.89 ABNORMAL CBC: ICD-10-CM

## 2024-05-28 PROCEDURE — 85025 COMPLETE CBC W/AUTO DIFF WBC: CPT

## 2024-05-29 LAB
BASOPHILS # BLD AUTO: 0.03 10*3/MM3 (ref 0–0.2)
BASOPHILS NFR BLD AUTO: 0.5 % (ref 0–1.5)
DEPRECATED RDW RBC AUTO: 40.5 FL (ref 37–54)
EOSINOPHIL # BLD AUTO: 0.05 10*3/MM3 (ref 0–0.4)
EOSINOPHIL NFR BLD AUTO: 0.9 % (ref 0.3–6.2)
ERYTHROCYTE [DISTWIDTH] IN BLOOD BY AUTOMATED COUNT: 12 % (ref 12.3–15.4)
HCT VFR BLD AUTO: 45.6 % (ref 37.5–51)
HGB BLD-MCNC: 15.7 G/DL (ref 13–17.7)
IMM GRANULOCYTES # BLD AUTO: 0.02 10*3/MM3 (ref 0–0.05)
IMM GRANULOCYTES NFR BLD AUTO: 0.4 % (ref 0–0.5)
LYMPHOCYTES # BLD AUTO: 1.9 10*3/MM3 (ref 0.7–3.1)
LYMPHOCYTES NFR BLD AUTO: 33.8 % (ref 19.6–45.3)
MCH RBC QN AUTO: 31.7 PG (ref 26.6–33)
MCHC RBC AUTO-ENTMCNC: 34.4 G/DL (ref 31.5–35.7)
MCV RBC AUTO: 91.9 FL (ref 79–97)
MONOCYTES # BLD AUTO: 0.35 10*3/MM3 (ref 0.1–0.9)
MONOCYTES NFR BLD AUTO: 6.2 % (ref 5–12)
NEUTROPHILS NFR BLD AUTO: 3.27 10*3/MM3 (ref 1.7–7)
NEUTROPHILS NFR BLD AUTO: 58.2 % (ref 42.7–76)
NRBC BLD AUTO-RTO: 0 /100 WBC (ref 0–0.2)
PLATELET # BLD AUTO: 291 10*3/MM3 (ref 140–450)
PMV BLD AUTO: 10.2 FL (ref 6–12)
RBC # BLD AUTO: 4.96 10*6/MM3 (ref 4.14–5.8)
WBC NRBC COR # BLD AUTO: 5.62 10*3/MM3 (ref 3.4–10.8)

## 2024-06-10 ENCOUNTER — TELEPHONE (OUTPATIENT)
Dept: FAMILY MEDICINE CLINIC | Facility: CLINIC | Age: 29
End: 2024-06-10

## 2024-06-10 NOTE — TELEPHONE ENCOUNTER
Caller: Matthew Castillo    Relationship: Self    Best call back number: [phone] (Home)     Requested Prescriptions:   Requested Prescriptions      No prescriptions requested or ordered in this encounter        escitalopram (Lexapro) 5 MG tablet     Pharmacy where request should be sent:  [facility]     Last office visit with prescribing clinician: 5/21/2024   Last telemedicine visit with prescribing clinician: Visit date not found   Next office visit with prescribing clinician: Visit date not found     Additional details provided by patient:  PATIENT WAS TOLD TO DOUBLE UP ON THE MEDICATION AND NOW HE IS READY FOR THE MEDICATION IN THE 10 MG     Does the patient have less than a 3 day supply:  [x] Yes  [] No    Would you like a call back once the refill request has been completed: [] Yes [x] No    If the office needs to give you a call back, can they leave a voicemail: [] Yes [x] No

## 2024-06-11 DIAGNOSIS — F41.9 ANXIETY: Primary | ICD-10-CM

## 2024-06-11 RX ORDER — ESCITALOPRAM OXALATE 10 MG/1
10 TABLET ORAL DAILY
Qty: 30 TABLET | Refills: 2 | Status: SHIPPED | OUTPATIENT
Start: 2024-06-11

## 2024-09-11 DIAGNOSIS — F41.9 ANXIETY: ICD-10-CM

## 2024-09-11 RX ORDER — ESCITALOPRAM OXALATE 10 MG/1
10 TABLET ORAL DAILY
Qty: 30 TABLET | Refills: 2 | Status: SHIPPED | OUTPATIENT
Start: 2024-09-11

## 2024-11-06 ENCOUNTER — TELEPHONE (OUTPATIENT)
Dept: CARDIOLOGY | Facility: CLINIC | Age: 29
End: 2024-11-06
Payer: COMMERCIAL
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
## 2024-11-07 ENCOUNTER — OFFICE VISIT (OUTPATIENT)
Dept: CARDIOLOGY | Facility: CLINIC | Age: 29
End: 2024-11-07
Payer: COMMERCIAL

## 2024-11-07 VITALS
HEART RATE: 78 BPM | HEIGHT: 71 IN | DIASTOLIC BLOOD PRESSURE: 78 MMHG | WEIGHT: 180.4 LBS | OXYGEN SATURATION: 98 % | SYSTOLIC BLOOD PRESSURE: 136 MMHG | BODY MASS INDEX: 25.26 KG/M2

## 2024-11-07 DIAGNOSIS — R00.2 PALPITATIONS: Primary | ICD-10-CM

## 2024-11-07 PROCEDURE — 99213 OFFICE O/P EST LOW 20 MIN: CPT | Performed by: PHYSICIAN ASSISTANT

## 2024-11-07 NOTE — PROGRESS NOTES
[facility] at [facility]        Matthew Castillo  1995  PCP: Siddharth Pond DO    SUBJECTIVE:   Matthew Castillo is a 29 y.o. 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  He reports episodes of tachypalpitations associated multiple different activities.  On last office visit he wore a Zio monitor that revealed no significant arrhythmias except the rare PACs associated symptoms were normal rhythm.  He feels a lot of his symptoms related anxiety has been working with therapist this is much improved.  Continues to cycle in a great deal up to 60 miles at a time he is going to do endurance ride the spring.  He has no physical limitations for details had no chest pain dizziness near syncope syncope.  He has no heart failure symptoms.  Overall feels in good place is making progress with his therapy feels a lot of symptoms were due to anxiety.     Cardiac PMH: (Old records have been reviewed and summarized below)  Palpitations, Heart rate varibility, Zio monitor February 19, 2024 monitor worn for 7 days average heart rate 70 bpm.  Predominant rhythm sinus rhythm with occasional sinus arrhythmia with no SVT events.  Occasional PACs correlate with symptoms  GERD  Prior ETOH use  GERD, Esophagitis   Anxiety, depression        Past Medical History, Past Surgical History, Family history, Social History, and Medications were all reviewed with the patient today and updated as necessary.     Current Outpatient Medications   Medication Sig Dispense Refill    cetirizine (ZyrTEC Allergy) 10 MG tablet Take 1 tablet by mouth Daily. (Patient not taking: Reported on 11/7/2024)      escitalopram (Lexapro) 10 MG tablet Take 1 tablet by mouth Daily. 30 tablet 2    pantoprazole (PROTONIX) 40 MG EC tablet Take  1 tablet by mouth 2 (Two) Times a Day. (Patient taking differently: Take 1 tablet by mouth 2 (Two) Times a Day. Taking 1 a day.) 60 tablet 11     No current facility-administered medications for this visit.     No Known Allergies      Past Medical History:   Diagnosis Date    Esophageal ulcer 2017    Hypertension     Irritable bowel syndrome      Past Surgical History:   Procedure Laterality Date    COLONOSCOPY      UPPER GASTROINTESTINAL ENDOSCOPY       Family History   Problem Relation Age of Onset    Diabetes Mother     Colon cancer Mother     Cancer Mother         Colon age 58    Hypertension Father     Dementia Maternal Grandmother     Lung cancer Maternal Grandfather     Lupus Paternal Grandmother     Heart attack Paternal Grandmother     Irritable bowel syndrome Paternal Grandfather     Crohn's disease Paternal Grandfather     Heart disease Paternal Grandfather         Multiple Aneurysms     Social History     Tobacco Use    Smoking status: Never    Smokeless tobacco: Former     Types: Chew     Quit date: 3/1/2020   Substance Use Topics    Alcohol use: Not Currently     Comment: quit 02/11/2021       ROS:  Review of Symptoms:  General: no recent weight loss/gain, weakness or fatigue  Skin: no rashes, lumps, or other skin changes  HEENT: no dizziness, lightheadedness, or vision changes  Respiratory: no cough or hemoptysis  Cardiovascular: + palpitations, and tachycardia  Gastrointestinal: no black/tarry stools or diarrhea  Urinary: no change in frequency or urgency  Peripheral Vascular: no claudication or leg cramps  Musculoskeletal: no muscle or joint pain/stiffness  Psychiatric: no depression or excessive stress  Neurological: no sensory or motor loss, no syncope  Hematologic: no anemia, easy bruising or bleeding  Endocrine: no thyroid problems, nor heat or cold intolerance         PHYSICAL EXAM:   /78 (BP Location: Right arm, Patient Position: Sitting, Cuff Size: Adult)   Pulse 78   Ht 180.3 cm  "(71\")   Wt 81.8 kg (180 lb 6.4 oz)   SpO2 98%   BMI 25.16 kg/m²      Wt Readings from Last 5 Encounters:   11/07/24 81.8 kg (180 lb 6.4 oz)   05/21/24 75.8 kg (167 lb)   04/30/24 78.5 kg (173 lb)   03/11/24 80.3 kg (177 lb)   01/31/24 76.2 kg (168 lb)     BP Readings from Last 5 Encounters:   11/07/24 136/78   05/21/24 122/76   04/30/24 148/90   03/11/24 110/66   01/31/24 134/78       General-Well Nourished, Well developed  Eyes - PERRLA  Neck- supple, No mass  CV- regular rate and rhythm, no MRG  Lung- clear bilaterally  Abd- soft, +BS  Musc/skel - Norm strength and range of motion  Skin- warm and dry  Neuro - Alert & Oriented x 3, appropriate mood.    Patient's external notes were reviewed.  Independent interpretation of test performed by another physician in facility were reviewed.  Outside laboratory data was also reviewed.    Medical problems and test results were reviewed with the patient today.     Results for orders placed or performed in visit on 05/28/24   CBC Auto Differential    Collection Time: 05/28/24 12:13 PM    Specimen: Blood   Result Value Ref Range    WBC 5.62 3.40 - 10.80 10*3/mm3    RBC 4.96 4.14 - 5.80 10*6/mm3    Hemoglobin 15.7 13.0 - 17.7 g/dL    Hematocrit 45.6 37.5 - 51.0 %    MCV 91.9 79.0 - 97.0 fL    MCH 31.7 26.6 - 33.0 pg    MCHC 34.4 31.5 - 35.7 g/dL    RDW 12.0 (L) 12.3 - 15.4 %    RDW-SD 40.5 37.0 - 54.0 fl    MPV 10.2 6.0 - 12.0 fL    Platelets 291 140 - 450 10*3/mm3    Neutrophil % 58.2 42.7 - 76.0 %    Lymphocyte % 33.8 19.6 - 45.3 %    Monocyte % 6.2 5.0 - 12.0 %    Eosinophil % 0.9 0.3 - 6.2 %    Basophil % 0.5 0.0 - 1.5 %    Immature Grans % 0.4 0.0 - 0.5 %    Neutrophils, Absolute 3.27 1.70 - 7.00 10*3/mm3    Lymphocytes, Absolute 1.90 0.70 - 3.10 10*3/mm3    Monocytes, Absolute 0.35 0.10 - 0.90 10*3/mm3    Eosinophils, Absolute 0.05 0.00 - 0.40 10*3/mm3    Basophils, Absolute 0.03 0.00 - 0.20 10*3/mm3    Immature Grans, Absolute 0.02 0.00 - 0.05 10*3/mm3    nRBC 0.0 0.0 " - 0.2 /100 WBC         Lab Results   Component Value Date    CHOLESTEROL 105 05/04/2022    HDL CHOL 43 05/04/2022    LDL CHOL 53 05/04/2022    LDL/HDL RATIO 1.30 05/04/2022    VLDL CHOL 9 05/04/2022    TRIGLYCERIDES 30 05/04/2022       Procedures    ASSESSMENT   1. Palplitations: ZIO monitor revealing heart rate variability occasional PACs.  Overall stable heart monitor.  2.  Marginal blood pressure      PLAN  Stable course feels like symptoms are anxiety approved by Zio monitor that were previously.  EKG is normal.  He has no symptoms suggesting angina or heart failure syncope or near syncope.  He is doing long distance riding is progressing well with this up to 60 miles at some rides.  He will continue to monitor his volume status and symptoms as previously admitted related to marginal blood pressure.  Aggressive hydration good nutrition.  Otherwise return follow-up or office as needed basis.     Cardiology/Electrophysiology  11/07/24  14:15 EST  Electronically signed by EDGARDO Dorsey, 03/11/24, 4:00 PM EDT.

## 2024-12-16 DIAGNOSIS — F41.9 ANXIETY: ICD-10-CM

## 2024-12-16 RX ORDER — ESCITALOPRAM OXALATE 10 MG/1
10 TABLET ORAL DAILY
Qty: 30 TABLET | Refills: 2 | Status: SHIPPED | OUTPATIENT
Start: 2024-12-16

## 2024-12-16 NOTE — TELEPHONE ENCOUNTER
Caller: Matthew Castillo    Relationship: Self    Best call back number:   Telephone Information:   Mobile [phone]     Requested Prescriptions:   Requested Prescriptions     Pending Prescriptions Disp Refills    escitalopram (Lexapro) 10 MG tablet 30 tablet 2     Sig: Take 1 tablet by mouth Daily.        Pharmacy where request should be sent: [facility] [id number] [address]  AT [facility] [address] [place] [address] - [phone]  - [phone] FX     Last office visit with prescribing clinician: 5/21/2024   Last telemedicine visit with prescribing clinician: Visit date not found   Next office visit with prescribing clinician: Visit date not found     Additional details provided by patient:     Does the patient have less than a 3 day supply:  [] Yes  [x] No    Would you like a call back once the refill request has been completed: [x] Yes [] No    If the office needs to give you a call back, can they leave a voicemail: [x] Yes [] No    Zahraa Guerra Rep   12/16/24 09:17 EST

## 2025-01-02 DIAGNOSIS — F41.9 ANXIETY: ICD-10-CM

## 2025-01-02 RX ORDER — ESCITALOPRAM OXALATE 10 MG/1
10 TABLET ORAL DAILY
Qty: 30 TABLET | Refills: 2 | Status: SHIPPED | OUTPATIENT
Start: 2025-01-02

## 2025-03-19 ENCOUNTER — LAB (OUTPATIENT)
Dept: LAB | Facility: HOSPITAL | Age: 30
End: 2025-03-19
Payer: COMMERCIAL

## 2025-03-19 ENCOUNTER — OFFICE VISIT (OUTPATIENT)
Dept: FAMILY MEDICINE CLINIC | Facility: CLINIC | Age: 30
End: 2025-03-19
Payer: COMMERCIAL

## 2025-03-19 VITALS
TEMPERATURE: 98.4 F | SYSTOLIC BLOOD PRESSURE: 122 MMHG | DIASTOLIC BLOOD PRESSURE: 84 MMHG | HEART RATE: 72 BPM | OXYGEN SATURATION: 98 % | WEIGHT: 193 LBS | BODY MASS INDEX: 27.02 KG/M2 | HEIGHT: 71 IN

## 2025-03-19 DIAGNOSIS — Z00.00 ANNUAL PHYSICAL EXAM: ICD-10-CM

## 2025-03-19 DIAGNOSIS — Z00.00 ANNUAL PHYSICAL EXAM: Primary | ICD-10-CM

## 2025-03-19 DIAGNOSIS — F41.9 ANXIETY: ICD-10-CM

## 2025-03-19 LAB
ALBUMIN SERPL-MCNC: 4.8 G/DL (ref 3.5–5.2)
ALBUMIN/GLOB SERPL: 1.8 G/DL
ALP SERPL-CCNC: 79 U/L (ref 39–117)
ALT SERPL W P-5'-P-CCNC: 54 U/L (ref 1–41)
ANION GAP SERPL CALCULATED.3IONS-SCNC: 12 MMOL/L (ref 5–15)
AST SERPL-CCNC: 38 U/L (ref 1–40)
BASOPHILS # BLD AUTO: 0.04 10*3/MM3 (ref 0–0.2)
BASOPHILS NFR BLD AUTO: 0.5 % (ref 0–1.5)
BILIRUB SERPL-MCNC: 0.4 MG/DL (ref 0–1.2)
BUN SERPL-MCNC: 15 MG/DL (ref 6–20)
BUN/CREAT SERPL: 13.9 (ref 7–25)
CALCIUM SPEC-SCNC: 9.3 MG/DL (ref 8.6–10.5)
CHLORIDE SERPL-SCNC: 103 MMOL/L (ref 98–107)
CHOLEST SERPL-MCNC: 151 MG/DL (ref 0–200)
CO2 SERPL-SCNC: 24 MMOL/L (ref 22–29)
CREAT SERPL-MCNC: 1.08 MG/DL (ref 0.76–1.27)
DEPRECATED RDW RBC AUTO: 41.2 FL (ref 37–54)
EGFRCR SERPLBLD CKD-EPI 2021: 94.1 ML/MIN/1.73
EOSINOPHIL # BLD AUTO: 0.07 10*3/MM3 (ref 0–0.4)
EOSINOPHIL NFR BLD AUTO: 0.9 % (ref 0.3–6.2)
ERYTHROCYTE [DISTWIDTH] IN BLOOD BY AUTOMATED COUNT: 12.2 % (ref 12.3–15.4)
GLOBULIN UR ELPH-MCNC: 2.6 GM/DL
GLUCOSE SERPL-MCNC: 78 MG/DL (ref 65–99)
HCT VFR BLD AUTO: 46.6 % (ref 37.5–51)
HDLC SERPL-MCNC: 54 MG/DL (ref 40–60)
HGB BLD-MCNC: 16.1 G/DL (ref 13–17.7)
IMM GRANULOCYTES # BLD AUTO: 0.02 10*3/MM3 (ref 0–0.05)
IMM GRANULOCYTES NFR BLD AUTO: 0.3 % (ref 0–0.5)
LDLC SERPL CALC-MCNC: 82 MG/DL (ref 0–100)
LDLC/HDLC SERPL: 1.51 {RATIO}
LYMPHOCYTES # BLD AUTO: 2.68 10*3/MM3 (ref 0.7–3.1)
LYMPHOCYTES NFR BLD AUTO: 34.9 % (ref 19.6–45.3)
MCH RBC QN AUTO: 31.9 PG (ref 26.6–33)
MCHC RBC AUTO-ENTMCNC: 34.5 G/DL (ref 31.5–35.7)
MCV RBC AUTO: 92.3 FL (ref 79–97)
MONOCYTES # BLD AUTO: 0.54 10*3/MM3 (ref 0.1–0.9)
MONOCYTES NFR BLD AUTO: 7 % (ref 5–12)
NEUTROPHILS NFR BLD AUTO: 4.32 10*3/MM3 (ref 1.7–7)
NEUTROPHILS NFR BLD AUTO: 56.4 % (ref 42.7–76)
NRBC BLD AUTO-RTO: 0 /100 WBC (ref 0–0.2)
PLATELET # BLD AUTO: 304 10*3/MM3 (ref 140–450)
PMV BLD AUTO: 10 FL (ref 6–12)
POTASSIUM SERPL-SCNC: 3.8 MMOL/L (ref 3.5–5.2)
PROT SERPL-MCNC: 7.4 G/DL (ref 6–8.5)
RBC # BLD AUTO: 5.05 10*6/MM3 (ref 4.14–5.8)
SODIUM SERPL-SCNC: 139 MMOL/L (ref 136–145)
TRIGL SERPL-MCNC: 76 MG/DL (ref 0–150)
TSH SERPL DL<=0.05 MIU/L-ACNC: 2.39 UIU/ML (ref 0.27–4.2)
VLDLC SERPL-MCNC: 15 MG/DL (ref 5–40)
WBC NRBC COR # BLD AUTO: 7.67 10*3/MM3 (ref 3.4–10.8)

## 2025-03-19 PROCEDURE — 80061 LIPID PANEL: CPT

## 2025-03-19 PROCEDURE — 99395 PREV VISIT EST AGE 18-39: CPT | Performed by: FAMILY MEDICINE

## 2025-03-19 PROCEDURE — 84443 ASSAY THYROID STIM HORMONE: CPT

## 2025-03-19 PROCEDURE — 85025 COMPLETE CBC W/AUTO DIFF WBC: CPT

## 2025-03-19 PROCEDURE — 80053 COMPREHEN METABOLIC PANEL: CPT

## 2025-03-19 RX ORDER — ESCITALOPRAM OXALATE 10 MG/1
10 TABLET ORAL DAILY
Qty: 90 TABLET | Refills: 3 | Status: SHIPPED | OUTPATIENT
Start: 2025-03-19

## 2025-03-19 NOTE — PROGRESS NOTES
Follow Up Office Visit      Patient Name: Matthew Castillo  : 1995   MRN: [id number]     Chief Complaint:    Chief Complaint   Patient presents with    Annual Exam       History of Present Illness: Matthew Castillo is a 29 y.o. male who is here today to follow up with anxiety, changing heart rate, low respirations.  Patient is here today for annual exam.  Ishan is improved but he is not having great days like he used to.  He used to be happier at times, but he says overall his anxiety is much better.  No ill side effects otherwise from Lexapro.      Patient was here for his annual exam so we discussed diet, exercise, dental care and vaccines.  Encouraged him to stay up-to-date with vaccines and dental care.  Encouraged him to stay active and eat a healthy diet.    Physical exam: Patient's mood and affect was appropriate.  Neurological exam grossly intact.  Ear exam showed patent canal and normal TM.  Thyroid midline normal.  Neck supple.  Heart exam RRR.  No murmurs.  Patient's lung exam was CTA bilaterally.      Subjective        I have reviewed and the following portions of the patient's history were updated as appropriate: past family history, past medical history, past social history, past surgical history and problem list.    Medications:     Current Outpatient Medications:     cetirizine (ZyrTEC Allergy) 10 MG tablet, Take 1 tablet by mouth Daily., Disp: , Rfl:     escitalopram (Lexapro) 10 MG tablet, Take 1 tablet by mouth Daily., Disp: 90 tablet, Rfl: 3    pantoprazole (PROTONIX) 40 MG EC tablet, Take 1 tablet by mouth 2 (Two) Times a Day. (Patient taking differently: Take 1 tablet by mouth 2 (Two) Times a Day. Taking 1 a day.), Disp: 60 tablet, Rfl: 11    Allergies:   No Known Allergies    Objective     Physical Exam: Please see above  Vital Signs:   Vitals:    25 1502   BP: 122/84   Pulse: 72   Temp: 98.4 °F (36.9 °C)   TempSrc: Infrared   SpO2: 98%   Weight: 87.5 kg (193 lb)  "  Height: 180 cm (70.87\")   PainSc: 0-No pain     Body mass index is 27.02 kg/m².          Assessment / Plan      Assessment/Plan:   Diagnoses and all orders for this visit:    1. Annual physical exam (Primary)  -     CBC & Differential; Future  -     Comprehensive Metabolic Panel; Future  -     Lipid Panel; Future  -     TSH Rfx On Abnormal To Free T4; Future    2. Anxiety  -     escitalopram (Lexapro) 10 MG tablet; Take 1 tablet by mouth Daily.  Dispense: 90 tablet; Refill: 3    Anxiety controlled.  Follow-up if side effects.  Can consider changing if needed      Annual exam counseling: Counseled patient regards to diet, exercise, dental care and vaccines.  Patient needs tetanus shot and 2 to 4 years.  Will discuss that in the future.  Deferred flu and COVID for today.  Patient to get flu shot next year if he wants.  Encouraged him to stay up-to-date with dental care.  Encouraged him to exercise regularly and to eat a healthy diet.    Follow Up:   Return in about 1 year (around 3/19/2026) for Annual, Labs today.    Siddharth Pond DO  [facility]   "

## 2025-03-20 ENCOUNTER — RESULTS FOLLOW-UP (OUTPATIENT)
Dept: LAB | Facility: HOSPITAL | Age: 30
End: 2025-03-20
Payer: COMMERCIAL

## 2025-05-04 DIAGNOSIS — K29.50 CHRONIC GASTRITIS WITHOUT BLEEDING, UNSPECIFIED GASTRITIS TYPE: ICD-10-CM

## 2025-05-06 ENCOUNTER — TELEPHONE (OUTPATIENT)
Dept: FAMILY MEDICINE CLINIC | Facility: CLINIC | Age: 30
End: 2025-05-06
Payer: COMMERCIAL

## 2025-05-06 NOTE — TELEPHONE ENCOUNTER
Caller: Matthew Castillo    Relationship: Self    Best call back number: [phone]    Requested Prescriptions:   PANTOPRAZOLE    Pharmacy where request should be sent: [facility] [id number] [place] [id number] [facility]  AT [facility] [address] - [phone] PH - [phone] FX     Last office visit with prescribing clinician: 3/19/2025   Last telemedicine visit with prescribing clinician: Visit date not found   Next office visit with prescribing clinician: Visit date not found     Additional details provided by patient: THE PATIENT WILL BE OUT ON THURSDAY IT WAS LAST FILLED BY A GI DOCTOR THAT NO LONGER WORKS FOR [facility] HE WOULD LIKE TO KNOW IF DOCTOR PRATIMA CAN FILL THAT FOR HIM     Does the patient have less than a 3 day supply:  [x] Yes  [] No    Would you like a call back once the refill request has been completed: [] Yes [x] No    If the office needs to give you a call back, can they leave a voicemail: [] Yes [x] No    Zahraa Alcocer Rep   05/06/25 14:23 EDT              0.98

## 2025-05-07 DIAGNOSIS — R10.13 EPIGASTRIC PAIN: Primary | ICD-10-CM

## 2025-05-07 RX ORDER — PANTOPRAZOLE SODIUM 40 MG/1
40 TABLET, DELAYED RELEASE ORAL DAILY
Qty: 90 TABLET | Refills: 3 | Status: SHIPPED | OUTPATIENT
Start: 2025-05-07

## 2025-05-08 RX ORDER — PANTOPRAZOLE SODIUM 40 MG/1
40 TABLET, DELAYED RELEASE ORAL 2 TIMES DAILY
Qty: 180 TABLET | Refills: 3 | OUTPATIENT
Start: 2025-05-08

## 2025-07-02 ENCOUNTER — HOSPITAL ENCOUNTER (EMERGENCY)
Facility: HOSPITAL | Age: 30
Discharge: HOME | End: 2025-07-02
Attending: FAMILY MEDICINE | Admitting: FAMILY MEDICINE
Payer: COMMERCIAL

## 2025-07-02 VITALS
BODY MASS INDEX: 27.52 KG/M2 | DIASTOLIC BLOOD PRESSURE: 79 MMHG | RESPIRATION RATE: 18 BRPM | WEIGHT: 196.6 LBS | TEMPERATURE: 98.2 F | HEART RATE: 66 BPM | OXYGEN SATURATION: 100 % | HEIGHT: 71 IN | SYSTOLIC BLOOD PRESSURE: 124 MMHG

## 2025-07-02 DIAGNOSIS — S09.93XA CHIN INJURY, INITIAL ENCOUNTER: Primary | ICD-10-CM

## 2025-07-02 DIAGNOSIS — S06.0X0A CONCUSSION WITHOUT LOSS OF CONSCIOUSNESS, INITIAL ENCOUNTER: ICD-10-CM

## 2025-07-02 PROCEDURE — 99284 EMERGENCY DEPT VISIT MOD MDM: CPT | Performed by: FAMILY MEDICINE
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
## 2025-08-03 ENCOUNTER — PATIENT MESSAGE (OUTPATIENT)
Dept: FAMILY MEDICINE CLINIC | Facility: CLINIC | Age: 30
End: 2025-08-03
Payer: COMMERCIAL